# Patient Record
Sex: FEMALE | Race: WHITE | NOT HISPANIC OR LATINO | Employment: UNEMPLOYED | URBAN - NONMETROPOLITAN AREA
[De-identification: names, ages, dates, MRNs, and addresses within clinical notes are randomized per-mention and may not be internally consistent; named-entity substitution may affect disease eponyms.]

---

## 2017-12-07 ENCOUNTER — OFFICE VISIT (OUTPATIENT)
Dept: FAMILY MEDICINE CLINIC | Facility: CLINIC | Age: 22
End: 2017-12-07

## 2017-12-07 VITALS
BODY MASS INDEX: 24.89 KG/M2 | SYSTOLIC BLOOD PRESSURE: 120 MMHG | TEMPERATURE: 98.6 F | HEIGHT: 68 IN | OXYGEN SATURATION: 96 % | WEIGHT: 164.2 LBS | HEART RATE: 88 BPM | DIASTOLIC BLOOD PRESSURE: 78 MMHG

## 2017-12-07 DIAGNOSIS — M54.2 NECK PAIN: Primary | ICD-10-CM

## 2017-12-07 DIAGNOSIS — M41.9 SCOLIOSIS OF THORACIC SPINE, UNSPECIFIED SCOLIOSIS TYPE: ICD-10-CM

## 2017-12-07 DIAGNOSIS — H60.501 ACUTE OTITIS EXTERNA OF RIGHT EAR, UNSPECIFIED TYPE: ICD-10-CM

## 2017-12-07 PROCEDURE — 99204 OFFICE O/P NEW MOD 45 MIN: CPT | Performed by: FAMILY MEDICINE

## 2017-12-07 RX ORDER — FLUTICASONE PROPIONATE 50 MCG
2 SPRAY, SUSPENSION (ML) NASAL DAILY
Qty: 1 BOTTLE | Refills: 11 | Status: SHIPPED | OUTPATIENT
Start: 2017-12-07 | End: 2018-01-02

## 2017-12-07 RX ORDER — OFLOXACIN 3 MG/ML
5 SOLUTION AURICULAR (OTIC) DAILY
Qty: 5 ML | Refills: 0 | Status: SHIPPED | OUTPATIENT
Start: 2017-12-07 | End: 2018-01-02

## 2017-12-07 RX ORDER — NICOTINE 21 MG/24HR
1 PATCH, TRANSDERMAL 24 HOURS TRANSDERMAL EVERY 24 HOURS
Qty: 28 PATCH | Refills: 0 | Status: SHIPPED | OUTPATIENT
Start: 2017-12-07 | End: 2018-03-21

## 2017-12-07 RX ORDER — TIZANIDINE 4 MG/1
4 TABLET ORAL EVERY 6 HOURS PRN
Qty: 60 TABLET | Refills: 1 | Status: SHIPPED | OUTPATIENT
Start: 2017-12-07 | End: 2018-03-21

## 2017-12-07 RX ORDER — LORATADINE 10 MG/1
10 TABLET ORAL DAILY
Qty: 30 TABLET | Refills: 11 | Status: SHIPPED | OUTPATIENT
Start: 2017-12-07 | End: 2018-03-21

## 2017-12-07 RX ORDER — GUAIFENESIN/DEXTROMETHORPHAN 100-10MG/5
10 SYRUP ORAL 4 TIMES DAILY PRN
Qty: 240 ML | Refills: 1 | Status: SHIPPED | OUTPATIENT
Start: 2017-12-07 | End: 2018-01-02

## 2017-12-07 RX ORDER — FLUCONAZOLE 150 MG/1
TABLET ORAL
Qty: 2 TABLET | Refills: 0 | Status: SHIPPED | OUTPATIENT
Start: 2017-12-07 | End: 2018-01-02

## 2017-12-07 NOTE — PROGRESS NOTES
Subjective   Zita Haas is a 22 y.o. female.     History of Present Illness     Pain right shoulder/neck area for over a year.  Remembers no trauma but was told she had scoliosis.  She had tried chiropractors in past but she would pop her own neck and lose the benefit she got from the chiropractor  Right ear problem for 4 days.  Had tried h2o2 into ear and no help.  Was on antibiotics, stopped when getting a yeast infection.  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  Transportation is a problem, cant make it to [place] for aa meetings    Pmh:  Anxiety, depression, ptsd  Psh: c section  Sh: smokes cigarettes 9 yrs, no etoh or pot, unemployed, single  Fh: none listed    Review of Systems   Constitutional: Negative for chills, fatigue and fever.   HENT: Positive for congestion, ear pain, hearing loss, sneezing and sore throat. Negative for ear discharge, facial swelling, postnasal drip, rhinorrhea, sinus pressure, trouble swallowing and voice change.    Eyes: Negative for discharge, redness and visual disturbance.   Respiratory: Positive for cough, shortness of breath and wheezing. Negative for chest tightness.    Cardiovascular: Negative for chest pain and palpitations.   Gastrointestinal: Negative for abdominal pain, blood in stool, constipation, diarrhea, nausea and vomiting.   Endocrine: Negative for polydipsia and polyuria.   Genitourinary: Negative for dysuria, flank pain, hematuria and urgency.   Musculoskeletal: Positive for back pain. Negative for arthralgias, joint swelling and myalgias.   Skin: Negative for rash.   Neurological: Positive for numbness. Negative for dizziness, weakness and headaches.   Hematological: Negative for adenopathy.   Psychiatric/Behavioral: Positive for sleep disturbance. Negative for confusion. The patient is nervous/anxious.        Objective   Physical Exam   Constitutional: She is oriented to person, place, and time. She appears well-developed and well-nourished.    HENT:   Head: Normocephalic and atraumatic.   Left Ear: External ear normal.   Nose: Nose normal.   Mouth/Throat: Oropharynx is clear and moist.   Ear canal right ear moist, skin macerated and pink. Tm is fine   Eyes: Conjunctivae and EOM are normal. Pupils are equal, round, and reactive to light.   Neck: Normal range of motion. Neck supple.   Cardiovascular: Normal rate, regular rhythm and normal heart sounds.  Exam reveals no gallop and no friction rub.    No murmur heard.  Pulmonary/Chest: Effort normal and breath sounds normal.   Abdominal: Soft. Bowel sounds are normal. She exhibits no distension. There is no tenderness. There is no rebound and no guarding.   Musculoskeletal: Normal range of motion. She exhibits no edema or deformity.   Very tender to light touch to right base of neck/shoulder.    Neurological: She is alert and oriented to person, place, and time. No cranial nerve deficit.   Skin: Skin is warm and dry. No rash noted. No erythema.   Psychiatric: She has a normal mood and affect. Her behavior is normal. Judgment and thought content normal.   Nursing note and vitals reviewed.      Assessment/Plan   Zita was seen today for ear problem, neck pain and shoulder pain.    Diagnoses and all orders for this visit:    Neck pain  -     XR Spine Cervical Complete 4 or 5 View  -     XR Spine Thoracic 3 View (In Office)  -     Ambulatory Referral to Physical Therapy    Acute otitis externa of right ear, unspecified type    Scoliosis of thoracic spine, unspecified scoliosis type  -     Ambulatory Referral to Physical Therapy    Other orders  -     loratadine (CLARITIN) 10 MG tablet; Take 1 tablet by mouth Daily.  -     fluticasone (FLONASE) 50 MCG/ACT nasal spray; 2 sprays into each nostril Daily.  -     guaifenesin-dextromethorphan (ROBITUSSIN DM) 100-10 MG/5ML syrup; Take 10 mL by mouth 4 (Four) Times a Day As Needed for Cough.  -     ofloxacin (FLOXIN) 0.3 % otic solution; Administer 5 drops to the right  ear Daily. For 7 days.  -     fluconazole (DIFLUCAN) 150 MG tablet; One now and one in 3 days.  -     tiZANidine (ZANAFLEX) 4 MG tablet; Take 1 tablet by mouth Every 6 (Six) Hours As Needed for Muscle Spasms.  -     diclofenac (VOLTAREN) 50 MG EC tablet; Take 1 tablet by mouth 2 (Two) Times a Day.  -     nicotine (NICODERM CQ) 14 MG/24HR patch; Place 1 patch on the skin Daily.  -     nicotine (NICODERM CQ) 7 MG/24HR patch; Place 1 patch on the skin Daily.      She wants to try the patches for smoking cessation  Xray scoliosis, order pt  Went over meds.

## 2017-12-14 ENCOUNTER — HOSPITAL ENCOUNTER (OUTPATIENT)
Dept: PHYSICAL THERAPY | Facility: HOSPITAL | Age: 22
Setting detail: THERAPIES SERIES
Discharge: HOME | End: 2017-12-14

## 2017-12-14 DIAGNOSIS — M54.2 CERVICALGIA: ICD-10-CM

## 2017-12-14 DIAGNOSIS — M41.34 THORACOGENIC SCOLIOSIS OF THORACIC REGION: Primary | ICD-10-CM

## 2017-12-14 PROCEDURE — 97110 THERAPEUTIC EXERCISES: CPT | Performed by: PHYSICAL THERAPIST

## 2017-12-14 PROCEDURE — 97161 PT EVAL LOW COMPLEX 20 MIN: CPT | Performed by: PHYSICAL THERAPIST

## 2017-12-14 NOTE — PROGRESS NOTES
Outpatient Physical Therapy Ortho Initial Evaluation  [facility]     Patient Name: Zita Haas  : 1995  MRN: [id number]  Today's Date: 2017      Visit Date: 2017        Attendance    Authorized 20   Pre Rx pain 9   Post Rx pain 9   % improvement 0   MD follow up PRN   Recert date 18            Past Medical History:   Diagnosis Date   • Depression    • Migraine    • Tobacco dependence syndrome         Past Surgical History:   Procedure Laterality Date   •  SECTION     MEDICATIONS:       amoxicillin-clavulanate (AUGMENTIN) 875-125 MG per tablet      diclofenac (VOLTAREN) 50 MG EC tablet      fluconazole (DIFLUCAN) 150 MG tablet      fluticasone (FLONASE) 50 MCG/ACT nasal spray      guaifenesin-dextromethorphan (ROBITUSSIN DM) 100-10 MG/5ML syrup      loratadine (CLARITIN) 10 MG tablet      naproxen (NAPROSYN) 375 MG tablet      nicotine (NICODERM CQ) 14 MG/24HR patch      nicotine (NICODERM CQ) 7 MG/24HR patch      ofloxacin (FLOXIN) 0.3 % otic solution      tiZANidine (ZANAFLEX) 4 MG tablet      varenicline (CHANTIX STARTING MONTH ) 0.5 MG X 11 & 1 MG X 42 tablet      ALLERGIES: NKDA    Visit Dx:     ICD-10-CM ICD-9-CM   1. Thoracogenic scoliosis of thoracic region M41.34 737.34   2. Cervicalgia M54.2 723.1             Patient History       17 1300          History    Chief Complaint Pain  -DD      Type of Pain Neck pain  -DD      Date Current Problem(s) Began --   2 weeks  -DD      Brief Description of Current Complaint Dx scoliosis a long  time, never did therapy she was prescribed, Increased pain over the4 last 2 weeks.  -DD      Hand Dominance right-handed  -DD      Occupation/sports/leisure activities Unemployed previous  at [employer]  -DD      What clinical tests have you had for this problem? X-ray  -DD        User Key  (r) = Recorded By, (t) = Taken By, (c) = Cosigned By    Initials Name Provider Type    DD Magda Hilario, PT  Physical Therapist                PT Ortho       12/14/17 1300    Subjective Comments    Subjective Comments Ribs are different on the right than left when you look at my stomach  -DD    Subjective Pain    Able to rate subjective pain? yes  -DD    Pre-Treatment Pain Level 9  -DD    Posture/Observations    Posture/Observations Comments Righ rib hump, outflared ribs  -DD    ROM (Range of Motion)    General ROM Detail Cervical [id number], ext 45, Right Rot 70 left 68, R lat flexion 30, Lefet 27. Full UE ROM   -DD    MMT (Manual Muscle Testing)    General MMT Assessment Detail 4+/5 flexion abd, ER 4/5, IR 5-/5  -DD      User Key  (r) = Recorded By, (t) = Taken By, (c) = Cosigned By    Initials Name Provider Type    OLYA Hilario, ERICK Physical Therapist                      Therapy Education  Given: HEP  Program: New  How Provided: Verbal, Written  Provided to: Patient  Level of Understanding: Verbalized, Demonstrated           PT OP Goals       12/14/17 1300       PT Short Term Goals    STG Date to Achieve 01/04/18  -DD     STG 1 Patient will be independent in home exercise program  -DD     STG 2 Patient will have improvement of 50%  -DD     STG 3 Patient will have pain-free cervical range of motion  -DD     STG 4 Patient will have pain-free upper extremity range of motion  -DD     STG 5 Pt will have NDI score 44% or better.  -DD     Long Term Goals    LTG Date to Achieve 01/18/18  -DD     Time Calculation    PT Goal Re-Cert Due Date 01/04/18  -DD       User Key  (r) = Recorded By, (t) = Taken By, (c) = Cosigned By    Initials Name Provider Type    OLYA Hilario, ERICK Physical Therapist                PT Assessment/Plan       12/14/17 1455       PT Assessment    Functional Limitations Performance in leisure activities;Limitation in home management;Performance in self-care ADL;Performance in work activities  -DD     Impairments Posture;Poor body mechanics;Pain;Muscle strength;Range of motion;Impaired  muscle endurance  -DD     Assessment Comments Patient has has significant biomechanical issues secondary to scoliosis.  She needs education to stretching flexibility and core and scapular stabilization activities muscle endurance training  -DD     Please refer to paper survey for additional self-reported information Yes  -DD     Rehab Potential Good  -DD     Patient/caregiver participated in establishment of treatment plan and goals Yes  -DD     Patient would benefit from skilled therapy intervention Yes  -DD     PT Plan    PT Frequency 2x/week  -DD     Predicted Duration of Therapy Intervention (days/wks) 4 wks  -DD     Planned CPT's? PT EVAL LOW COMPLEXITY: 32440;PT MANUAL THERAPY EA 15 MIN: 04763;PT THER PROC EA 15 MIN: 28472;PT ELECTRICAL STIM UNATTEND: ;PT HOT/COLD PACK WC NONMCARE: 80936  -DD     Physical Therapy Interventions (Optional Details) manual therapy techniques;postural re-education;modalities;home exercise program;lumbar stabilization;strengthening;stretching  -DD     PT Plan Comments Core stabilization program for thoracic and cervical musculature.  Biomechanical postural defect due to scoliosis.  Modalities may include heat and stim.  -DD       User Key  (r) = Recorded By, (t) = Taken By, (c) = Cosigned By    Initials Name Provider Type    DD Magda Hliario, PT Physical Therapist                  Exercises       12/14/17 1300          Subjective Comments    Subjective Comments Ribs are different on the right than left when you look at my stomach  -DD      Subjective Pain    Able to rate subjective pain? yes  -DD      Pre-Treatment Pain Level 9  -DD      Exercise 1    Exercise Name 1 CT  -DD      Reps 1 10  -DD      Exercise 2    Exercise Name 2 shoulder rolls  -DD      Reps 2 20  -DD      Exercise 3    Exercise Name 3 no $  -DD      Reps 3 20  -DD      Exercise 4    Exercise Name 4 Right side bend stretch seated  -DD      Time (Minutes) 4 3  -DD      Exercise 5    Exercise Name 5  posterior capsule stretch  -DD      Reps 5 3  -DD      Time (Seconds) 5 30  -DD      Exercise 6    Exercise Name 6 pec door stretch  -DD      Reps 6 2  -DD      Time (Seconds) 6 30  -DD      Exercise 7    Exercise Name 7 UT stretch  -DD      Reps 7 2  -DD      Time (Seconds) 7 30  -DD      Exercise 8    Exercise Name 8 Levator stretch  -DD      Reps 8 2  -DD      Time (Seconds) 8 30  -DD      Exercise 9    Exercise Name 9 mid back stretch  -DD      Reps 9 2  -DD      Time (Seconds) 9 30  -DD        User Key  (r) = Recorded By, (t) = Taken By, (c) = Cosigned By    Initials Name Provider Type    DD Magda Hilario, PT Physical Therapist                        Outcome Measure Options: Neck Disability Index (NDI)  Neck Disability Index  Section 1 - Pain Intensity: The pain is fairly severe at the moment.  Section 2 - Personal Care: I can look after myself normally, but it causes extra pain.  Section 3 - Lifting: Pain prevents me from lifting heavy weights, but I can manage light weights if they are conveniently positioned.  Section 4 - Work: I can do most of my usual work, but no more  Section 5 - Headaches: I have moderate headaches that come frequently  Section 6 - Concentration: I have a lot of difficulty concentrating.  Section 7 - Sleeping: My sleep is mildly disturbed for up to 1-2 hours.  Section 8 - Driving: I can't drive as long as I want because of moderate neck pain.  Section 9 - Reading: I can't read as much as I want because of severe neck pain.  Section 10 - Recreation: I have neck pain with most recreational activities.  Neck Disability Index Score: 27  Neck Disability Index Comments: 54%      Time Calculation:   Start Time: 1300  Stop Time: 1340  Time Calculation (min): 40 min     Therapy Charges for Today     Code Description Service Date Service Provider Modifiers Qty    30717313721 HC PT THER PROC EA 15 MIN 12/14/2017 Magda Hilario, PT GP 2    [id number]  PT EVAL LOW COMPLEXITY 1  12/14/2017 Magda Hilario, PT GP 1          PT G-Codes  Outcome Measure Options: Neck Disability Index (NDI)         Magda Hilario, PT, ATC, DPT  12/14/2017

## 2017-12-18 ENCOUNTER — HOSPITAL ENCOUNTER (OUTPATIENT)
Dept: PHYSICAL THERAPY | Facility: HOSPITAL | Age: 22
Setting detail: THERAPIES SERIES
Discharge: HOME | End: 2017-12-18

## 2017-12-18 DIAGNOSIS — M41.34 THORACOGENIC SCOLIOSIS OF THORACIC REGION: Primary | ICD-10-CM

## 2017-12-18 DIAGNOSIS — M54.2 CERVICALGIA: ICD-10-CM

## 2017-12-18 PROCEDURE — 97110 THERAPEUTIC EXERCISES: CPT

## 2017-12-18 PROCEDURE — G0283 ELEC STIM OTHER THAN WOUND: HCPCS

## 2017-12-18 NOTE — PROGRESS NOTES
Outpatient Physical Therapy Ortho Treatment Note   [facility]     Patient Name: Zita Haas  : 1995  MRN: [id number]  Today's Date: 2017      Visit Date: 2017    Subjective Improvement:  0%     Attendance:   Approved:   20 Visits        MD follow up:   prn         date:   18      Visit Dx:    ICD-10-CM ICD-9-CM   1. Thoracogenic scoliosis of thoracic region M41.34 737.34   2. Cervicalgia M54.2 723.1       There is no problem list on file for this patient.       Past Medical History:   Diagnosis Date   • Depression    • Migraine    • Tobacco dependence syndrome         Past Surgical History:   Procedure Laterality Date   •  SECTION               PT Ortho       17 1300    Subjective Pain    Able to rate subjective pain? yes  -TM    Pre-Treatment Pain Level 8  -TM    Subjective Pain Comment neck & upper back  -TM    Posture/Observations    Posture/Observations Comments R concave scoliosis  -TM      User Key  (r) = Recorded By, (t) = Taken By, (c) = Cosigned By    Initials Name Provider Type    TM Catie Reddy PTA Physical Therapy Assistant                            PT Assessment/Plan       17 1400       PT Assessment    Functional Limitations Performance in leisure activities;Limitation in home management;Performance in self-care ADL;Performance in work activities  -TM     Impairments Posture;Poor body mechanics;Pain;Muscle strength;Range of motion;Impaired muscle endurance  -TM     Assessment Comments Pt required very few cues for current HEP.  Presented with high reported pain level.  Tolerated therex well.  Added IFC/MH post therex for pain relief.   -TM     Rehab Potential Good  -TM     Patient/caregiver participated in establishment of treatment plan and goals Yes  -TM     Patient would benefit from skilled therapy intervention Yes  -TM     PT Plan    PT Frequency 2x/week  -TM     Predicted Duration of Therapy Intervention (days/wks) 4  "weeks  -TM     PT Plan Comments seated press downs  -TM       User Key  (r) = Recorded By, (t) = Taken By, (c) = Cosigned By    Initials Name Provider Type    TM Catie Reddy PTA Physical Therapy Assistant                Modalities       12/18/17 1300          ELECTRICAL STIMULATION    Attended/Unattended Unattended  -TM      Stimulation Type IFC  -TM      Location/Electrode Placement/Other cervical/thoracic  -TM      Rx Minutes 20 mins  -TM        User Key  (r) = Recorded By, (t) = Taken By, (c) = Cosigned By    Initials Name Provider Type     Catie Reddy PTA Physical Therapy Assistant                Exercises       12/18/17 1300          Subjective Pain    Able to rate subjective pain? yes  -TM      Pre-Treatment Pain Level 8  -TM      Subjective Pain Comment neck & upper back  -TM      Exercise 1    Exercise Name 1 doorway pec stretch  -TM      Reps 1 2  -TM      Time (Seconds) 1 30  -TM      Exercise 2    Exercise Name 2 midback stretch  -TM      Reps 2 2  -TM      Time (Seconds) 2 30  -TM      Exercise 3    Exercise Name 3 UT stretch B  -TM      Reps 3 2  -TM      Time (Seconds) 3 30  -TM      Exercise 4    Exercise Name 4 levator stretch B  -TM      Reps 4 2  -TM      Time (Seconds) 4 30  -TM      Exercise 5    Exercise Name 5 R side bend seated stretch  -TM      Reps 5 2  -TM      Time (Seconds) 5 30  -TM      Exercise 6    Exercise Name 6 chin tucks  -TM      Sets 6 2  -TM      Reps 6 10  -TM      Exercise 7    Exercise Name 7 shoulder rolls fwd/bwd  -TM      Reps 7 20  -TM      Exercise 8    Exercise Name 8 no money  -TM      Sets 8 2  -TM      Reps 8 10  -TM      Time (Seconds) 8 5\"  -TM      Exercise 9    Exercise Name 9 reverse corner push outs  -TM      Sets 9 2  -TM      Reps 9 10  -TM        User Key  (r) = Recorded By, (t) = Taken By, (c) = Cosigned By    Initials Name Provider Type     Catie Reddy PTA Physical Therapy Assistant                               PT OP Goals     "   12/18/17 1300       PT Short Term Goals    STG Date to Achieve 01/04/18  -TM     STG 1 Patient will be independent in home exercise program  -TM     STG 1 Progress Ongoing  -TM     STG 2 Patient will have improvement of 50%  -TM     STG 2 Progress Ongoing  -TM     STG 3 Patient will have pain-free cervical range of motion  -TM     STG 3 Progress Ongoing  -TM     STG 4 Patient will have pain-free upper extremity range of motion  -TM     STG 4 Progress Ongoing  -TM     STG 5 Pt will have NDI score 44% or better.  -TM     STG 5 Progress Ongoing  -TM     Long Term Goals    LTG Date to Achieve 01/18/18  -       User Key  (r) = Recorded By, (t) = Taken By, (c) = Cosigned By    Initials Name Provider Type     Catie Reddy PTA Physical Therapy Assistant                         Time Calculation:   Start Time: 1345  Stop Time: 1435  Time Calculation (min): 50 min  Total Timed Code Minutes- PT: 30 minute(s)    Therapy Charges for Today     Code Description Service Date Service Provider Modifiers Qty    25847162863 HC PT THER PROC EA 15 MIN 12/18/2017 Catie Reddy PTA GP 2    01050942373 HC PT ELECTRICAL STIM UNATTENDED 12/18/2017 Catie Reddy PTA  1                    Catie Reddy PTA  12/18/2017

## 2017-12-28 ENCOUNTER — HOSPITAL ENCOUNTER (OUTPATIENT)
Dept: PHYSICAL THERAPY | Facility: HOSPITAL | Age: 22
Setting detail: THERAPIES SERIES
End: 2017-12-28

## 2018-01-02 ENCOUNTER — HOSPITAL ENCOUNTER (OUTPATIENT)
Dept: PHYSICAL THERAPY | Facility: HOSPITAL | Age: 23
Setting detail: THERAPIES SERIES
Discharge: HOME | End: 2018-01-02

## 2018-01-02 ENCOUNTER — PROCEDURE VISIT (OUTPATIENT)
Dept: OBSTETRICS AND GYNECOLOGY | Facility: CLINIC | Age: 23
End: 2018-01-02

## 2018-01-02 VITALS
DIASTOLIC BLOOD PRESSURE: 68 MMHG | HEIGHT: 69 IN | WEIGHT: 173 LBS | SYSTOLIC BLOOD PRESSURE: 112 MMHG | BODY MASS INDEX: 25.62 KG/M2

## 2018-01-02 DIAGNOSIS — M41.34 THORACOGENIC SCOLIOSIS OF THORACIC REGION: Primary | ICD-10-CM

## 2018-01-02 DIAGNOSIS — Z01.419 WOMEN'S ANNUAL ROUTINE GYNECOLOGICAL EXAMINATION: Primary | ICD-10-CM

## 2018-01-02 DIAGNOSIS — M54.2 CERVICALGIA: ICD-10-CM

## 2018-01-02 PROCEDURE — 88142 CYTOPATH C/V THIN LAYER: CPT | Performed by: OBSTETRICS & GYNECOLOGY

## 2018-01-02 PROCEDURE — 99395 PREV VISIT EST AGE 18-39: CPT | Performed by: OBSTETRICS & GYNECOLOGY

## 2018-01-02 PROCEDURE — G0283 ELEC STIM OTHER THAN WOUND: HCPCS

## 2018-01-02 PROCEDURE — 97110 THERAPEUTIC EXERCISES: CPT

## 2018-01-02 NOTE — PROGRESS NOTES
Outpatient Physical Therapy Ortho Treatment Note   [facility]     Patient Name: Zita Haas  : 1995  MRN: [id number]  Today's Date: 2018      Visit Date: 2018    Subjective Improvement:     Neck 40%; back 20%  Attendance: 3/3  Approved:     20 Visits      MD follow up:   prn         date:  18       Visit Dx:    ICD-10-CM ICD-9-CM   1. Thoracogenic scoliosis of thoracic region M41.34 737.34   2. Cervicalgia M54.2 723.1       There is no problem list on file for this patient.       Past Medical History:   Diagnosis Date   • Depression    • Migraine    • Tobacco dependence syndrome         Past Surgical History:   Procedure Laterality Date   •  SECTION               PT Ortho       18 1300    Subjective Pain    Able to rate subjective pain? yes  -TM    Pre-Treatment Pain Level 7  -TM    Subjective Pain Comment upper back  -TM      User Key  (r) = Recorded By, (t) = Taken By, (c) = Cosigned By    Initials Name Provider Type     Catie Reddy PTA Physical Therapy Assistant                            PT Assessment/Plan       18 1300       PT Assessment    Functional Limitations Performance in leisure activities;Limitation in home management;Performance in self-care ADL;Performance in work activities  -TM     Impairments Posture;Poor body mechanics;Pain;Muscle strength;Range of motion;Impaired muscle endurance  -TM     Assessment Comments Pt reporting greater improvement with neck than back at this time.  Good tolerance for therex.  -TM     Rehab Potential Good  -TM     Patient/caregiver participated in establishment of treatment plan and goals Yes  -TM     Patient would benefit from skilled therapy intervention Yes  -TM     PT Plan    PT Frequency 2x/week  -TM     Predicted Duration of Therapy Intervention (days/wks) 4 weeks  -TM       User Key  (r) = Recorded By, (t) = Taken By, (c) = Cosigned By    Initials Name Provider Type    JORGE Baker  SAMIA Reddy Physical Therapy Assistant                Modalities       01/02/18 1300          Moist Heat    MH Applied Yes  -TM      Location upper back with IFC  -TM      Rx Minutes --   20 min  -TM      MH S/P Rx Yes  -TM      ELECTRICAL STIMULATION    Attended/Unattended Unattended  -TM      Stimulation Type IFC  -TM      Location/Electrode Placement/Other cervical/thoracic  -TM      Rx Minutes 20 mins  -TM        User Key  (r) = Recorded By, (t) = Taken By, (c) = Cosigned By    Initials Name Provider Type    TM Catie Olivia Reddy PTA Physical Therapy Assistant                Exercises       01/02/18 1300          Subjective Comments    Subjective Comments Reports neck feeling better, but back still hurting.  Thinks IFC last visit was helpful.  -TM      Subjective Pain    Able to rate subjective pain? yes  -TM      Pre-Treatment Pain Level 7  -TM      Subjective Pain Comment upper back  -TM      Exercise 1    Exercise Name 1 PRO II UE fwd/bwd  -TM      Time (Minutes) 1 8  -TM      Additional Comments level 2  -TM      Exercise 2    Exercise Name 2 doorway pec stretch  -TM      Reps 2 2  -TM      Time (Seconds) 2 30  -TM      Exercise 3    Exercise Name 3 midback stretch  -TM      Reps 3 2  -TM      Time (Seconds) 3 30  -TM      Exercise 4    Exercise Name 4 R side bend stretch  -TM      Reps 4 2  -TM      Time (Seconds) 4 30  -TM      Exercise 5    Exercise Name 5 chin tucks  -TM      Sets 5 2  -TM      Reps 5 10  -TM      Exercise 6    Exercise Name 6 no money  -TM      Sets 6 2  -TM      Reps 6 10  -TM      Exercise 7    Exercise Name 7 T Band rows  -TM      Sets 7 2  -TM      Reps 7 10  -TM      Additional Comments green  -TM      Exercise 8    Exercise Name 8 T Band ext  -TM      Sets 8 2  -TM      Reps 8 10  -TM      Additional Comments green  -TM      Exercise 9    Exercise Name 9 reverse corners  -TM      Sets 9 2  -TM      Reps 9 10  -TM        User Key  (r) = Recorded By, (t) = Taken By, (c) = Cosigned  By    Initials Name Provider Type     Catie Reddy PTA Physical Therapy Assistant                               PT OP Goals       01/02/18 1300       PT Short Term Goals    STG Date to Achieve 01/04/18  -TM     STG 1 Patient will be independent in home exercise program  -TM     STG 1 Progress Ongoing  -TM     STG 2 Patient will have improvement of 50%  -TM     STG 2 Progress Ongoing  -TM     STG 3 Patient will have pain-free cervical range of motion  -TM     STG 3 Progress Ongoing  -TM     STG 4 Patient will have pain-free upper extremity range of motion  -TM     STG 4 Progress Ongoing  -TM     STG 5 Pt will have NDI score 44% or better.  -TM     STG 5 Progress Ongoing  -TM     Long Term Goals    LTG Date to Achieve 01/18/18  -       User Key  (r) = Recorded By, (t) = Taken By, (c) = Cosigned By    Initials Name Provider Type    TM Catie Reddy PTA Physical Therapy Assistant                         Time Calculation:   Start Time: 1347  Stop Time: 1440  Time Calculation (min): 53 min  Total Timed Code Minutes- PT: 33 minute(s)    Therapy Charges for Today     Code Description Service Date Service Provider Modifiers Qty    50950513620 HC PT ELECTRICAL STIM UNATTENDED 1/2/2018 Catie Reddy, PTA  1    55103794439 HC PT THER PROC EA 15 MIN 1/2/2018 Catie Reddy PTA GP 2                    Catie Reddy PTA  1/2/2018

## 2018-01-02 NOTE — PROGRESS NOTES
Subjective   Zita Haas is a 22 y.o. female.     HPI Comments: Patient presents today for annual gynecologic exam   Last pap smear was   All paps have been normal    LMP: 17.  Periods are irregular  Female partner  Considering Mirena for contraception    Gynecologic Exam   The patient's pertinent negatives include no vaginal discharge. Pertinent negatives include no abdominal pain.         Review of Systems   Gastrointestinal: Negative for abdominal pain.   Genitourinary: Negative for menstrual problem, vaginal bleeding and vaginal discharge.   All other systems reviewed and are negative.      Objective   Physical Exam   Constitutional: She is oriented to person, place, and time. She appears well-developed and well-nourished. No distress.   HENT:   Head: Normocephalic and atraumatic.   Right Ear: External ear normal.   Left Ear: External ear normal.   Nose: Nose normal.   Mouth/Throat: Oropharynx is clear and moist. No oropharyngeal exudate.   Eyes: Conjunctivae and EOM are normal. Pupils are equal, round, and reactive to light. Right eye exhibits no discharge. Left eye exhibits no discharge. No scleral icterus.   Neck: Normal range of motion. Neck supple. No tracheal deviation present. No thyromegaly present.   Cardiovascular: Normal rate, regular rhythm, normal heart sounds and intact distal pulses.  Exam reveals no gallop and no friction rub.    No murmur heard.  Pulmonary/Chest: Effort normal and breath sounds normal. No respiratory distress. She has no wheezes. She has no rales. She exhibits no mass, no tenderness, no laceration, no deformity and no retraction. Right breast exhibits no inverted nipple, no mass, no nipple discharge, no skin change and no tenderness. Left breast exhibits no inverted nipple, no mass, no nipple discharge, no skin change and no tenderness. Breasts are symmetrical. There is no breast swelling.   Abdominal: Soft. She exhibits no distension and no mass. There is  no tenderness. There is no rebound and no guarding. No hernia.   Genitourinary: Vagina normal and uterus normal. No breast tenderness, discharge or bleeding. Pelvic exam was performed with patient supine. No labial fusion. There is no rash, tenderness, lesion or injury on the right labia. There is no rash, tenderness, lesion or injury on the left labia. Uterus is not deviated, not enlarged, not fixed and not tender. Cervix exhibits no motion tenderness, no discharge and no friability. Right adnexum displays no mass, no tenderness and no fullness. Left adnexum displays no mass, no tenderness and no fullness. No erythema, tenderness or bleeding in the vagina. No foreign body in the vagina. No signs of injury around the vagina. No vaginal discharge found.   Genitourinary Comments: Pap collected     Musculoskeletal: Normal range of motion. She exhibits no edema or deformity.   Neurological: She is alert and oriented to person, place, and time. No cranial nerve deficit. Coordination normal.   Skin: Skin is warm and dry. No rash noted. She is not diaphoretic. No erythema. No pallor.   Psychiatric: She has a normal mood and affect. Her behavior is normal. Judgment and thought content normal.   Vitals reviewed.      Assessment/Plan   Zita was seen today for gynecologic exam.    Diagnoses and all orders for this visit:    Women's annual routine gynecological examination  -     Liquid-based Pap Smear, Screening - ThinPrep Vial, Cervix  -     Pregnancy, Urine - Urine, Clean Catch       Pap collected  F/u 1 year and PRN

## 2018-01-04 LAB
LAB AP CASE REPORT: NORMAL
LAB AP GYN ADDITIONAL INFORMATION: NORMAL
Lab: NORMAL
PATH INTERP SPEC-IMP: NORMAL
STAT OF ADQ CVX/VAG CYTO-IMP: NORMAL

## 2018-01-05 ENCOUNTER — TELEPHONE (OUTPATIENT)
Dept: OBSTETRICS AND GYNECOLOGY | Facility: CLINIC | Age: 23
End: 2018-01-05

## 2018-01-05 NOTE — TELEPHONE ENCOUNTER
----- Message from Segunod Pino MD sent at 1/5/2018 10:20 AM CST -----  Please send normal pap letter  I sent a normal pap letter.

## 2018-01-09 ENCOUNTER — HOSPITAL ENCOUNTER (OUTPATIENT)
Dept: PHYSICAL THERAPY | Facility: HOSPITAL | Age: 23
Setting detail: THERAPIES SERIES
Discharge: HOME | End: 2018-01-09

## 2018-01-09 DIAGNOSIS — M41.34 THORACOGENIC SCOLIOSIS OF THORACIC REGION: Primary | ICD-10-CM

## 2018-01-09 DIAGNOSIS — M54.2 CERVICALGIA: ICD-10-CM

## 2018-01-09 PROCEDURE — G0283 ELEC STIM OTHER THAN WOUND: HCPCS

## 2018-01-09 PROCEDURE — 97110 THERAPEUTIC EXERCISES: CPT

## 2018-01-09 NOTE — PROGRESS NOTES
Outpatient Physical Therapy Ortho Treatment Note   [facility]     Patient Name: Zita Haas  : 1995  MRN: [id number]  Today's Date: 2018      Visit Date: 2018    Subjective Improvement:    Neck 40%; Back 20%   Attendance:   Approved:    20 Visits       MD follow up:    prn        date:   18      Visit Dx:    ICD-10-CM ICD-9-CM   1. Thoracogenic scoliosis of thoracic region M41.34 737.34   2. Cervicalgia M54.2 723.1       There is no problem list on file for this patient.       Past Medical History:   Diagnosis Date   • Depression    • Migraine    • Tobacco dependence syndrome         Past Surgical History:   Procedure Laterality Date   •  SECTION               PT Ortho       18 1300    Subjective Comments    Subjective Comments Reports having a toothache today.  Neck still feels better than upper back.  Started a new job this week.    -TM    Subjective Pain    Able to rate subjective pain? yes  -TM    Pre-Treatment Pain Level 5  -TM    Subjective Pain Comment upper back  -TM      User Key  (r) = Recorded By, (t) = Taken By, (c) = Cosigned By    Initials Name Provider Type    TM Catie Reddy PTA Physical Therapy Assistant                            PT Assessment/Plan       18 1400       PT Assessment    Functional Limitations Performance in leisure activities;Limitation in home management;Performance in self-care ADL;Performance in work activities  -TM     Impairments Posture;Poor body mechanics;Pain;Muscle strength;Range of motion;Impaired muscle endurance  -TM     Rehab Potential Good  -TM     Patient/caregiver participated in establishment of treatment plan and goals Yes  -TM     Patient would benefit from skilled therapy intervention Yes  -TM     PT Plan    PT Frequency 2x/week  -TM     Predicted Duration of Therapy Intervention (days/wks) 4 weeks  -TM       User Key  (r) = Recorded By, (t) = Taken By, (c) = Cosigned By    Initials Name  Provider Type    TM Catie Reddy PTA Physical Therapy Assistant                    Exercises       01/09/18 1300          Subjective Comments    Subjective Comments Reports having a toothache today.  Neck still feels better than upper back.  Started a new job this week.    -TM      Subjective Pain    Able to rate subjective pain? yes  -TM      Pre-Treatment Pain Level 5  -TM      Subjective Pain Comment upper back  -TM      Exercise 1    Exercise Name 1 PRO II UE fwd/bwd  -TM      Time (Minutes) 1 8  -TM      Additional Comments level 2  -TM      Exercise 2    Exercise Name 2 doorway pec stretch  -TM      Reps 2 2  -TM      Time (Seconds) 2 30  -TM      Exercise 3    Exercise Name 3 midback stretch  -TM      Reps 3 2  -TM      Time (Seconds) 3 30  -TM      Exercise 4    Exercise Name 4 R side bend stretch over lg bolster  -TM      Reps 4 2  -TM      Time (Seconds) 4 30  -TM      Exercise 5    Exercise Name 5 chin tucks  -TM      Sets 5 2  -TM      Reps 5 10  -TM      Exercise 6    Exercise Name 6 no money  -TM      Sets 6 2  -TM      Reps 6 10  -TM      Additional Comments red  -TM      Exercise 7    Exercise Name 7 T Band rows  -TM      Sets 7 2  -TM      Reps 7 10  -TM      Additional Comments green  -TM      Exercise 8    Exercise Name 8 T Band ext  -TM      Sets 8 2  -TM      Reps 8 10  -TM      Additional Comments green  -TM      Exercise 9    Exercise Name 9 supine B serratus punches  -TM      Sets 9 2  -TM      Reps 9 10  -TM      Additional Comments 2# DB  -TM      Exercise 10    Exercise Name 10 supine scap clocks  -TM      Sets 10 2  -TM      Reps 10 10  -TM      Additional Comments red  -TM        User Key  (r) = Recorded By, (t) = Taken By, (c) = Cosigned By    Initials Name Provider Type    TM Catie Reddy PTA Physical Therapy Assistant                               PT OP Goals       01/09/18 1400       PT Short Term Goals    STG Date to Achieve 01/04/18  -TM     STG 1 Patient will be  independent in home exercise program  -TM     STG 1 Progress Ongoing  -TM     STG 2 Patient will have improvement of 50%  -TM     STG 2 Progress Ongoing  -TM     STG 3 Patient will have pain-free cervical range of motion  -TM     STG 3 Progress Ongoing  -TM     STG 4 Patient will have pain-free upper extremity range of motion  -TM     STG 4 Progress Ongoing  -TM     STG 5 Pt will have NDI score 44% or better.  -TM     STG 5 Progress Ongoing  -TM     Long Term Goals    LTG Date to Achieve 01/18/18  -       User Key  (r) = Recorded By, (t) = Taken By, (c) = Cosigned By    Initials Name Provider Type    TM Catie Reddy PTA Physical Therapy Assistant          Therapy Education  Education Details: HEP: T Band rows, ext, no money, scap clocks  Given: HEP  Program: Progressed  How Provided: Demonstration, Written  Provided to: Patient  Level of Understanding: Teach back education performed              Time Calculation:   Start Time: 1345  Stop Time: 1444  Time Calculation (min): 59 min  Total Timed Code Minutes- PT: 59 minute(s)    Therapy Charges for Today     Code Description Service Date Service Provider Modifiers Qty    28314562520 HC PT ELECTRICAL STIM UNATTENDED 1/9/2018 Catie Reddy PTA  1    67348121099 HC PT THER PROC EA 15 MIN 1/9/2018 Catie Reddy PTA GP 2                    Catie Reddy PTA  1/9/2018

## 2018-01-18 ENCOUNTER — HOSPITAL ENCOUNTER (OUTPATIENT)
Dept: PHYSICAL THERAPY | Facility: HOSPITAL | Age: 23
Setting detail: THERAPIES SERIES
Discharge: HOME | End: 2018-01-18

## 2018-01-18 DIAGNOSIS — M41.34 THORACOGENIC SCOLIOSIS OF THORACIC REGION: Primary | ICD-10-CM

## 2018-01-18 DIAGNOSIS — M54.2 CERVICALGIA: ICD-10-CM

## 2018-01-18 PROCEDURE — G0283 ELEC STIM OTHER THAN WOUND: HCPCS

## 2018-01-18 PROCEDURE — 97110 THERAPEUTIC EXERCISES: CPT

## 2018-01-18 PROCEDURE — G0283 ELEC STIM OTHER THAN WOUND: HCPCS | Performed by: PHYSICAL THERAPIST

## 2018-01-18 PROCEDURE — 97140 MANUAL THERAPY 1/> REGIONS: CPT | Performed by: PHYSICAL THERAPIST

## 2018-01-18 NOTE — PROGRESS NOTES
Outpatient Physical Therapy Ortho Progress Note  [facility]     Patient Name: Zita Haas  : 1995  MRN: [id number]  Today's Date: 2018        Attendance    Authorized 20   Pre Rx pain 9   Post Rx pain 0   % improvement 60%   MD follow up PRN   Recert date            Visit Date: 2018    Visit Dx:    ICD-10-CM ICD-9-CM   1. Thoracogenic scoliosis of thoracic region M41.34 737.34   2. Cervicalgia M54.2 723.1       There is no problem list on file for this patient.       Past Medical History:   Diagnosis Date   • Depression    • Migraine    • Tobacco dependence syndrome         Past Surgical History:   Procedure Laterality Date   •  SECTION               PT Ortho       18 1300    Subjective Pain    Able to rate subjective pain? yes  -TM    Pre-Treatment Pain Level 9  -TM    Post-Treatment Pain Level 0  -DD    Subjective Pain Comment upperback  -DD    Posture/Observations    Posture/Observations Comments slumped rounded shoulder posture, Scolisos with Right rib hump  -DD    ROM (Range of Motion)    General ROM Detail full UE and cervical ROM  -DD    MMT (Manual Muscle Testing)    General MMT Assessment Detail 5/5 UE  -DD      User Key  (r) = Recorded By, (t) = Taken By, (c) = Cosigned By    Initials Name Provider Type    TM Catie Reddy, PTA Physical Therapy Assistant    DD Magda Hilario, PT Physical Therapist                            PT Assessment/Plan       18 1512       PT Assessment    Functional Limitations Performance in work activities;Limitation in home management  -DD     Impairments Pain;Muscle strength;Range of motion  -DD     Assessment Comments Pt reporting good improvement, does not exercises daily. good relief of pain with treatment  -DD     Please refer to paper survey for additional self-reported information No  -DD     Rehab Potential Good  -DD     Patient/caregiver participated in establishment of treatment plan and goals Yes   -DD     Patient would benefit from skilled therapy intervention Yes  -DD     PT Plan    PT Frequency 2x/week  -DD     Predicted Duration of Therapy Intervention (days/wks) 2 weeks  -DD     Planned CPT's? PT MANUAL THERAPY EA 15 MIN: 47775;PT THER PROC EA 15 MIN: 73274;PT ELECTRICAL STIM UNATTEND: ;PT HOT/COLD PACK WC NONMCARE: 56811  -DD     PT Plan Comments Progres back and scapular stabilization, Manual therapy as needed. MH and stim PRN.  -DD       User Key  (r) = Recorded By, (t) = Taken By, (c) = Cosigned By    Initials Name Provider Type    DD Magda Hilario, PT Physical Therapist                Modalities       01/18/18 1300          Moist Heat    MH Applied Yes  -TM      Location upper back with IFC  -TM      Rx Minutes --   20 min  -TM       S/P Rx Yes  -TM      ELECTRICAL STIMULATION    Attended/Unattended Unattended  -TM      Stimulation Type IFC  -TM      Location/Electrode Placement/Other cervical/thoracic  -TM      Rx Minutes 20 mins  -TM        User Key  (r) = Recorded By, (t) = Taken By, (c) = Cosigned By    Initials Name Provider Type    TM Catie Reddy, PTA Physical Therapy Assistant                Exercises       01/18/18 1300          Subjective Comments    Subjective Comments Reports pain up today, but has been working out in the cold.  -TM      Subjective Pain    Able to rate subjective pain? yes  -TM      Pre-Treatment Pain Level 9  -TM      Post-Treatment Pain Level 0  -DD      Subjective Pain Comment upperback  -DD      Exercise 1    Exercise Name 1 PRO II UE fwd/bwd  -TM      Time (Minutes) 1 8  -TM      Additional Comments level 2  -TM      Exercise 2    Exercise Name 2 doorway pec stretch  -TM      Reps 2 2  -TM      Time (Seconds) 2 30  -TM      Exercise 3    Exercise Name 3 midback stretch  -TM      Reps 3 2  -TM      Time (Seconds) 3 30  -TM      Exercise 4    Exercise Name 4 R side bend stretch over lg bolster  -TM      Reps 4 2  -TM      Time (Seconds) 4 30  -TM       Exercise 5    Exercise Name 5 chin tucks  -TM      Sets 5 2  -TM      Reps 5 10  -TM      Exercise 6    Exercise Name 6 no money  -TM      Sets 6 2  -TM      Reps 6 10  -TM      Additional Comments red  -TM      Exercise 7    Exercise Name 7 T Band rows  -TM      Sets 7 2  -TM      Reps 7 10  -TM      Additional Comments green  -TM      Exercise 8    Exercise Name 8 T Band ext  -TM      Sets 8 2  -TM      Reps 8 10  -TM      Additional Comments green  -TM      Exercise 9    Exercise Name 9 T Band trunk rotation B  -TM      Sets 9 2  -TM      Reps 9 10  -TM      Additional Comments green  -TM      Exercise 10    Exercise Name 10 supine scap clocks  -TM      Sets 10 2  -TM      Reps 10 10  -TM      Additional Comments red  -TM      Exercise 11    Exercise Name 11 supine serratus punches  -TM      Sets 11 2  -TM      Reps 11 10  -TM      Additional Comments 2# DB  -TM      Exercise 12    Exercise Name 12 prone over physioball YTI  -TM      Sets 12 2  -TM      Reps 12 10  -TM        User Key  (r) = Recorded By, (t) = Taken By, (c) = Cosigned By    Initials Name Provider Type    TM Catie Reddy, PTA Physical Therapy Assistant    DD Magda Hilario, PT Physical Therapist                               PT OP Goals       01/18/18 1508 01/18/18 1400    PT Short Term Goals    STG Date to Achieve  01/04/18  -DD    STG 1  Patient will be independent in home exercise program  -DD    STG 1 Progress  Progressing  -DD    STG 2  Patient will have improvement of 50%  -DD    STG 2 Progress  Met  -DD    STG 2 Progress Comments  60%  -DD    STG 3  Patient will have pain-free cervical range of motion  -DD    STG 3 Progress  Met  -DD    STG 4  Patient will have pain-free upper extremity range of motion  -DD    STG 4 Progress  Met  -DD    STG 5  Pt will have NDI score 44% or better.  -DD    STG 5 Progress  Ongoing  -DD    Long Term Goals    LTG Date to Achieve  02/01/18  -DD    LTG 1  Pt will have overall improved posture  with minimal cues  -DD    LTG 2  Pt resting pain less than 3/10  -DD    Time Calculation    PT Goal Re-Cert Due Date 02/01/18  -DD       User Key  (r) = Recorded By, (t) = Taken By, (c) = Cosigned By    Initials Name Provider Type    DD Magda Hilario, PT Physical Therapist                         Time Calculation:   Start Time: 1340  Stop Time: 1447  Time Calculation (min): 67 min  Total Timed Code Minutes- PT: 50 minute(s)    Therapy Charges for Today     Code Description Service Date Service Provider Modifiers Qty    65167269354 HC PT MANUAL THERAPY EA 15 MIN 1/18/2018 Magda Hilario, PT GP 1    87841104697 HC PT ELECTRICAL STIM UNATTENDED 1/18/2018 Magda Hilario, PT  1                    Magda Hilario, PT, ATC, DPT  1/18/2018

## 2018-02-22 ENCOUNTER — DOCUMENTATION (OUTPATIENT)
Dept: PHYSICAL THERAPY | Facility: HOSPITAL | Age: 23
End: 2018-02-22

## 2018-03-21 ENCOUNTER — APPOINTMENT (OUTPATIENT)
Dept: LAB | Facility: HOSPITAL | Age: 23
End: 2018-03-21

## 2018-03-21 PROCEDURE — 84702 CHORIONIC GONADOTROPIN TEST: CPT | Performed by: NURSE PRACTITIONER

## 2018-03-27 ENCOUNTER — OFFICE VISIT (OUTPATIENT)
Dept: FAMILY MEDICINE CLINIC | Facility: CLINIC | Age: 23
End: 2018-03-27

## 2018-03-27 VITALS
OXYGEN SATURATION: 99 % | SYSTOLIC BLOOD PRESSURE: 114 MMHG | TEMPERATURE: 98.1 F | DIASTOLIC BLOOD PRESSURE: 74 MMHG | WEIGHT: 172.8 LBS | HEART RATE: 90 BPM | HEIGHT: 68 IN | BODY MASS INDEX: 26.19 KG/M2

## 2018-03-27 DIAGNOSIS — L30.0 NUMMULAR ECZEMA: ICD-10-CM

## 2018-03-27 DIAGNOSIS — N92.6 IRREGULAR PERIODS: Primary | ICD-10-CM

## 2018-03-27 PROCEDURE — 84702 CHORIONIC GONADOTROPIN TEST: CPT | Performed by: FAMILY MEDICINE

## 2018-03-27 PROCEDURE — 99213 OFFICE O/P EST LOW 20 MIN: CPT | Performed by: FAMILY MEDICINE

## 2018-03-27 PROCEDURE — 36415 COLL VENOUS BLD VENIPUNCTURE: CPT | Performed by: FAMILY MEDICINE

## 2018-03-27 RX ORDER — ACETAMINOPHEN 325 MG/1
650 TABLET ORAL EVERY 6 HOURS PRN
Qty: 100 TABLET | Refills: 0 | Status: SHIPPED | OUTPATIENT
Start: 2018-03-27 | End: 2018-04-30

## 2018-03-27 NOTE — PROGRESS NOTES
Subjective   Zita Haas is a 22 y.o. female.     History of Present Illness     Patient had a quantitative bhcg more than twice normal.  She was told to come to her regular doctor and have another one to make sure pregnancy ok?   She has rash on body 2 months.  Itches. Was told it was fungal, treatment failed.  Was told last was eczema.  Has kenalog 0.1% cream.  She is clean 5 months and is to go to rehab for support.  She has to know if pregnant for rehab.    Review of Systems   Constitutional: Negative for chills, fatigue and fever.   HENT: Negative for congestion, ear discharge, ear pain, facial swelling, hearing loss, postnasal drip, rhinorrhea, sinus pressure, sore throat, trouble swallowing and voice change.    Eyes: Negative for discharge, redness and visual disturbance.   Respiratory: Negative for cough, chest tightness, shortness of breath and wheezing.    Cardiovascular: Negative for chest pain and palpitations.   Gastrointestinal: Negative for abdominal pain, blood in stool, constipation, diarrhea, nausea and vomiting.   Endocrine: Negative for polydipsia and polyuria.   Genitourinary: Negative for dysuria, flank pain, hematuria and urgency.   Musculoskeletal: Negative for arthralgias, back pain, joint swelling and myalgias.   Skin: Positive for rash.   Neurological: Negative for dizziness, weakness, numbness and headaches.   Hematological: Negative for adenopathy.   Psychiatric/Behavioral: Negative for confusion and sleep disturbance. The patient is not nervous/anxious.        Objective   Physical Exam   Constitutional: She is oriented to person, place, and time. She appears well-developed and well-nourished.   HENT:   Head: Normocephalic and atraumatic.   Right Ear: External ear normal.   Left Ear: External ear normal.   Nose: Nose normal.   Eyes: Conjunctivae and EOM are normal. Pupils are equal, round, and reactive to light.   Neck: Normal range of motion.   Pulmonary/Chest: Effort normal.    Musculoskeletal: Normal range of motion.   Neurological: She is alert and oriented to person, place, and time.   Skin: Rash noted.   Several well demarcated red scaley oval/circular plaques 2cm to 4cm.  Arms, torso. (i didn't look at thighs/legs/etc)  No central clearing at all.   Psychiatric: She has a normal mood and affect. Her behavior is normal. Judgment and thought content normal.   Nursing note and vitals reviewed.      Assessment/Plan   Zita was seen today for follow-up.    Diagnoses and all orders for this visit:    Irregular periods  -     HCG, B-subunit, Quantitative    Nummular eczema    Other orders  -     acetaminophen (TYLENOL) 325 MG tablet; Take 2 tablets by mouth Every 6 (Six) Hours As Needed for Mild Pain .      Call for results.

## 2018-03-28 LAB — HCG INTACT+B SERPL-ACNC: 174.75 MIU/ML

## 2018-04-18 ENCOUNTER — HOSPITAL ENCOUNTER (EMERGENCY)
Facility: HOSPITAL | Age: 23
Discharge: HOME | End: 2018-04-18
Attending: EMERGENCY MEDICINE | Admitting: EMERGENCY MEDICINE

## 2018-04-18 ENCOUNTER — HOSPITAL ENCOUNTER (EMERGENCY)
Facility: HOSPITAL | Age: 23
Discharge: LEFT BEFORE DISCHARGE | End: 2018-04-18
Attending: EMERGENCY MEDICINE

## 2018-04-18 VITALS
OXYGEN SATURATION: 100 % | HEIGHT: 68 IN | SYSTOLIC BLOOD PRESSURE: 101 MMHG | DIASTOLIC BLOOD PRESSURE: 54 MMHG | HEART RATE: 83 BPM | BODY MASS INDEX: 26.52 KG/M2 | RESPIRATION RATE: 20 BRPM | TEMPERATURE: 97.5 F | WEIGHT: 175 LBS

## 2018-04-18 VITALS
DIASTOLIC BLOOD PRESSURE: 77 MMHG | WEIGHT: 175 LBS | RESPIRATION RATE: 18 BRPM | BODY MASS INDEX: 27.47 KG/M2 | OXYGEN SATURATION: 100 % | HEART RATE: 92 BPM | HEIGHT: 67 IN | TEMPERATURE: 98.2 F | SYSTOLIC BLOOD PRESSURE: 119 MMHG
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
## 2018-04-18 DIAGNOSIS — Z3A.01 LESS THAN 8 WEEKS GESTATION OF PREGNANCY: ICD-10-CM

## 2018-04-18 DIAGNOSIS — T07.XXXA ABRASIONS OF MULTIPLE SITES: ICD-10-CM

## 2018-04-18 PROCEDURE — 99284 EMERGENCY DEPT VISIT MOD MDM: CPT

## 2018-04-18 PROCEDURE — 90471 IMMUNIZATION ADMIN: CPT | Performed by: PHYSICIAN ASSISTANT

## 2018-04-18 PROCEDURE — 99211 OFF/OP EST MAY X REQ PHY/QHP: CPT

## 2018-04-18 PROCEDURE — 25010000002 TDAP 5-2.5-18.5 LF-MCG/0.5 SUSPENSION: Performed by: PHYSICIAN ASSISTANT

## 2018-04-18 PROCEDURE — 90715 TDAP VACCINE 7 YRS/> IM: CPT | Performed by: PHYSICIAN ASSISTANT

## 2018-04-18 RX ORDER — ACETAMINOPHEN 325 MG/1
650 TABLET ORAL ONCE
Status: COMPLETED | OUTPATIENT
Start: 2018-04-18 | End: 2018-04-18

## 2018-04-18 RX ORDER — DIAPER,BRIEF,INFANT-TODD,DISP
EACH MISCELLANEOUS
Status: COMPLETED
Start: 2018-04-18 | End: 2018-04-18

## 2018-04-18 RX ADMIN — ACETAMINOPHEN 650 MG: 325 TABLET ORAL at 17:36

## 2018-04-18 RX ADMIN — TETANUS TOXOID, REDUCED DIPHTHERIA TOXOID AND ACELLULAR PERTUSSIS VACCINE, ADSORBED 0.5 ML: 5; 2.5; 8; 8; 2.5 SUSPENSION INTRAMUSCULAR at 17:05

## 2018-04-18 RX ADMIN — BACITRACIN: 500 OINTMENT TOPICAL at 17:06

## 2018-04-18 RX ADMIN — MUPIROCIN: 20 OINTMENT TOPICAL at 18:10
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
## 2018-04-30 ENCOUNTER — APPOINTMENT (OUTPATIENT)
Dept: LAB | Facility: HOSPITAL | Age: 23
End: 2018-04-30

## 2018-04-30 ENCOUNTER — OFFICE VISIT (OUTPATIENT)
Dept: OBSTETRICS AND GYNECOLOGY | Facility: CLINIC | Age: 23
End: 2018-04-30

## 2018-04-30 VITALS
BODY MASS INDEX: 26.81 KG/M2 | SYSTOLIC BLOOD PRESSURE: 108 MMHG | DIASTOLIC BLOOD PRESSURE: 68 MMHG | HEIGHT: 69 IN | WEIGHT: 181 LBS

## 2018-04-30 DIAGNOSIS — Z32.01 PREGNANCY EXAMINATION OR TEST, POSITIVE RESULT: Primary | ICD-10-CM

## 2018-04-30 LAB
ABO GROUP BLD: NORMAL
AMPHET+METHAMPHET UR QL: NEGATIVE
BARBITURATES UR QL SCN: NEGATIVE
BASOPHILS # BLD AUTO: 0.07 10*3/MM3 (ref 0–0.2)
BASOPHILS NFR BLD AUTO: 0.5 % (ref 0–2)
BENZODIAZ UR QL SCN: NEGATIVE
BILIRUB UR QL STRIP: NEGATIVE
BLD GP AB SCN SERPL QL: NEGATIVE
CANNABINOIDS SERPL QL: NEGATIVE
CLARITY UR: ABNORMAL
COCAINE UR QL: NEGATIVE
COLOR UR: YELLOW
DEPRECATED RDW RBC AUTO: 45.9 FL (ref 36.4–46.3)
EOSINOPHIL # BLD AUTO: 0.4 10*3/MM3 (ref 0–0.7)
EOSINOPHIL NFR BLD AUTO: 3 % (ref 0–7)
ERYTHROCYTE [DISTWIDTH] IN BLOOD BY AUTOMATED COUNT: 13.9 % (ref 11.5–14.5)
GLUCOSE UR STRIP-MCNC: NEGATIVE MG/DL
HCT VFR BLD AUTO: 37 % (ref 35–45)
HGB BLD-MCNC: 12.6 G/DL (ref 12–15.5)
HGB UR QL STRIP.AUTO: NEGATIVE
IMM GRANULOCYTES # BLD: 0.04 10*3/MM3 (ref 0–0.02)
IMM GRANULOCYTES NFR BLD: 0.3 % (ref 0–0.5)
KETONES UR QL STRIP: NEGATIVE
LEUKOCYTE ESTERASE UR QL STRIP.AUTO: NEGATIVE
LYMPHOCYTES # BLD AUTO: 3.08 10*3/MM3 (ref 0.6–4.2)
LYMPHOCYTES NFR BLD AUTO: 23.5 % (ref 10–50)
Lab: NORMAL
MCH RBC QN AUTO: 30.7 PG (ref 26.5–34)
MCHC RBC AUTO-ENTMCNC: 34.1 G/DL (ref 31.4–36)
MCV RBC AUTO: 90.2 FL (ref 80–98)
METHADONE UR QL SCN: NEGATIVE
MONOCYTES # BLD AUTO: 0.72 10*3/MM3 (ref 0–0.9)
MONOCYTES NFR BLD AUTO: 5.5 % (ref 0–12)
NEUTROPHILS # BLD AUTO: 8.82 10*3/MM3 (ref 2–8.6)
NEUTROPHILS NFR BLD AUTO: 67.2 % (ref 37–80)
NITRITE UR QL STRIP: NEGATIVE
OPIATES UR QL: NEGATIVE
OXYCODONE UR QL SCN: NEGATIVE
PH UR STRIP.AUTO: 8 [PH] (ref 5–9)
PLATELET # BLD AUTO: 247 10*3/MM3 (ref 150–450)
PMV BLD AUTO: 10.6 FL (ref 8–12)
PROT UR QL STRIP: ABNORMAL
RBC # BLD AUTO: 4.1 10*6/MM3 (ref 3.77–5.16)
RH BLD: POSITIVE
SP GR UR STRIP: 1.02 (ref 1–1.03)
UROBILINOGEN UR QL STRIP: ABNORMAL
WBC NRBC COR # BLD: 13.13 10*3/MM3 (ref 3.2–9.8)

## 2018-04-30 PROCEDURE — 80307 DRUG TEST PRSMV CHEM ANLYZR: CPT | Performed by: OBSTETRICS & GYNECOLOGY

## 2018-04-30 PROCEDURE — 36415 COLL VENOUS BLD VENIPUNCTURE: CPT | Performed by: OBSTETRICS & GYNECOLOGY

## 2018-04-30 PROCEDURE — G0432 EIA HIV-1/HIV-2 SCREEN: HCPCS | Performed by: OBSTETRICS & GYNECOLOGY

## 2018-04-30 PROCEDURE — 87340 HEPATITIS B SURFACE AG IA: CPT | Performed by: OBSTETRICS & GYNECOLOGY

## 2018-04-30 PROCEDURE — 85025 COMPLETE CBC W/AUTO DIFF WBC: CPT | Performed by: OBSTETRICS & GYNECOLOGY

## 2018-04-30 PROCEDURE — 87086 URINE CULTURE/COLONY COUNT: CPT | Performed by: OBSTETRICS & GYNECOLOGY

## 2018-04-30 PROCEDURE — 86762 RUBELLA ANTIBODY: CPT | Performed by: OBSTETRICS & GYNECOLOGY

## 2018-04-30 PROCEDURE — 86850 RBC ANTIBODY SCREEN: CPT | Performed by: OBSTETRICS & GYNECOLOGY

## 2018-04-30 PROCEDURE — 86901 BLOOD TYPING SEROLOGIC RH(D): CPT | Performed by: OBSTETRICS & GYNECOLOGY

## 2018-04-30 PROCEDURE — 86803 HEPATITIS C AB TEST: CPT | Performed by: OBSTETRICS & GYNECOLOGY

## 2018-04-30 PROCEDURE — 86900 BLOOD TYPING SEROLOGIC ABO: CPT | Performed by: OBSTETRICS & GYNECOLOGY

## 2018-04-30 PROCEDURE — 99213 OFFICE O/P EST LOW 20 MIN: CPT | Performed by: OBSTETRICS & GYNECOLOGY

## 2018-04-30 PROCEDURE — 81003 URINALYSIS AUTO W/O SCOPE: CPT | Performed by: OBSTETRICS & GYNECOLOGY

## 2018-04-30 RX ORDER — ONDANSETRON 4 MG/1
4 TABLET, ORALLY DISINTEGRATING ORAL EVERY 6 HOURS PRN
Qty: 30 TABLET | Refills: 3 | Status: SHIPPED | OUTPATIENT
Start: 2018-04-30 | End: 2018-09-04
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
## 2018-05-01 LAB
BACTERIA SPEC AEROBE CULT: NORMAL
BACTERIA SPEC AEROBE CULT: NORMAL

## 2018-05-02 LAB
HBV SURFACE AG SERPL QL IA: NEGATIVE
HCV AB SER DONR QL: NEGATIVE
HIV1+2 AB SER QL: NEGATIVE
RUBV IGG SER QL: ABNORMAL
RUBV IGG SER-ACNC: 12 IU/ML (ref 0–9.9)

## 2018-05-05 LAB — RPR SER QL: NORMAL

## 2018-05-09 ENCOUNTER — INITIAL PRENATAL (OUTPATIENT)
Dept: OBSTETRICS AND GYNECOLOGY | Facility: CLINIC | Age: 23
End: 2018-05-09

## 2018-05-09 VITALS — DIASTOLIC BLOOD PRESSURE: 70 MMHG | BODY MASS INDEX: 28.17 KG/M2 | WEIGHT: 188 LBS | SYSTOLIC BLOOD PRESSURE: 102 MMHG

## 2018-05-09 DIAGNOSIS — Z34.90 THIRD PREGNANCY: ICD-10-CM

## 2018-05-09 DIAGNOSIS — Z32.01 PREGNANCY TEST PERFORMED, PREGNANCY CONFIRMED: Primary | ICD-10-CM

## 2018-05-09 DIAGNOSIS — Z3A.10 10 WEEKS GESTATION OF PREGNANCY: ICD-10-CM

## 2018-05-09 DIAGNOSIS — O34.211 MATERNAL CARE DUE TO LOW TRANSVERSE UTERINE SCAR FROM PREVIOUS CESAREAN DELIVERY: ICD-10-CM

## 2018-05-09 LAB
CANDIDA ALBICANS: NEGATIVE
GARDNERELLA VAGINALIS: POSITIVE
TRICHOMONAS VAGINALIS PCR: NEGATIVE

## 2018-05-09 PROCEDURE — 87491 CHLMYD TRACH DNA AMP PROBE: CPT | Performed by: OBSTETRICS & GYNECOLOGY

## 2018-05-09 PROCEDURE — 87660 TRICHOMONAS VAGIN DIR PROBE: CPT | Performed by: OBSTETRICS & GYNECOLOGY

## 2018-05-09 PROCEDURE — 87591 N.GONORRHOEAE DNA AMP PROB: CPT | Performed by: OBSTETRICS & GYNECOLOGY

## 2018-05-09 PROCEDURE — 99213 OFFICE O/P EST LOW 20 MIN: CPT | Performed by: OBSTETRICS & GYNECOLOGY

## 2018-05-09 PROCEDURE — 87661 TRICHOMONAS VAGINALIS AMPLIF: CPT | Performed by: OBSTETRICS & GYNECOLOGY

## 2018-05-09 PROCEDURE — 87480 CANDIDA DNA DIR PROBE: CPT | Performed by: OBSTETRICS & GYNECOLOGY

## 2018-05-09 PROCEDURE — 87510 GARDNER VAG DNA DIR PROBE: CPT | Performed by: OBSTETRICS & GYNECOLOGY

## 2018-05-09 NOTE — PROGRESS NOTES
Discussed dietary considerations and weight gain in pregnancy  Encouraged breastfeeding  Last pregnancy was delivered at 36 wk with limited prenatal care, baby was severely growth restricted with REDF.  Poor weight gain with last pregnancy  Nausea is well controlled with Zofran  Prior C/S x 1, plans for repeat  Requests cell free fetal DNA testing  Reviewed prenatal labs  Reviewed US results including EDC:   A/P 23 yo  at 10+6 for prenatal visit  Cell free fetal DNA testing  F/u 4 wk for next prenatal appt  Cell free fetal DNA today  Vaginitis and GC/CT today

## 2018-05-10 LAB
C TRACH RRNA CVX QL NAA+PROBE: NEGATIVE
N GONORRHOEA RRNA SPEC QL NAA+PROBE: NEGATIVE
T VAGINALIS DNA VAG QL PROBE+SIG AMP: NEGATIVE

## 2018-06-06 ENCOUNTER — ROUTINE PRENATAL (OUTPATIENT)
Dept: OBSTETRICS AND GYNECOLOGY | Facility: CLINIC | Age: 23
End: 2018-06-06

## 2018-06-06 VITALS — WEIGHT: 195 LBS | SYSTOLIC BLOOD PRESSURE: 104 MMHG | DIASTOLIC BLOOD PRESSURE: 64 MMHG | BODY MASS INDEX: 29.22 KG/M2

## 2018-06-06 DIAGNOSIS — O34.211 MATERNAL CARE DUE TO LOW TRANSVERSE UTERINE SCAR FROM PREVIOUS CESAREAN DELIVERY: Primary | ICD-10-CM

## 2018-06-06 DIAGNOSIS — Z3A.14 14 WEEKS GESTATION OF PREGNANCY: ICD-10-CM

## 2018-06-06 DIAGNOSIS — Z36.89 ENCOUNTER FOR FETAL ANATOMIC SURVEY: ICD-10-CM

## 2018-06-06 PROCEDURE — 99213 OFFICE O/P EST LOW 20 MIN: CPT | Performed by: OBSTETRICS & GYNECOLOGY

## 2018-06-06 NOTE — PROGRESS NOTES
Patient reports frequent dizziness  Nausea is well controlled  Dizziness improves with juice  No VB or LOF, no vaginal discharge  A/P 24 yo  at 14+6 for prenatal visit  Pregnancy is complicated by history of miscarriage, history of fetal growth restriction, h/o PTD and prior C/S x 1.  F/u 4 wk for next prenatal appt and anatomy US  Encouraged hydration

## 2018-06-21 ENCOUNTER — DOCUMENTATION (OUTPATIENT)
Dept: OBSTETRICS AND GYNECOLOGY | Facility: CLINIC | Age: 23
End: 2018-06-21

## 2018-07-10 ENCOUNTER — ROUTINE PRENATAL (OUTPATIENT)
Dept: OBSTETRICS AND GYNECOLOGY | Facility: CLINIC | Age: 23
End: 2018-07-10

## 2018-07-10 VITALS — BODY MASS INDEX: 29.95 KG/M2 | WEIGHT: 197 LBS | DIASTOLIC BLOOD PRESSURE: 68 MMHG | SYSTOLIC BLOOD PRESSURE: 100 MMHG

## 2018-07-10 DIAGNOSIS — Z3A.19 19 WEEKS GESTATION OF PREGNANCY: ICD-10-CM

## 2018-07-10 DIAGNOSIS — O34.211 MATERNAL CARE DUE TO LOW TRANSVERSE UTERINE SCAR FROM PREVIOUS CESAREAN DELIVERY: Primary | ICD-10-CM

## 2018-07-10 PROCEDURE — 99213 OFFICE O/P EST LOW 20 MIN: CPT | Performed by: OBSTETRICS & GYNECOLOGY

## 2018-07-10 NOTE — PROGRESS NOTES
Dizziness continues to improve  Nausea is well controlled  Thinks that she's felt the baby move once  No VB or LOF  A/P 22 yo  at 19+5 for prenatal visit  Pregnancy is complicated by history of miscarriage, history of fetal growth restriction, h/o PTD and prior C/S x1  F/u 4 wk for next prenatal appt  1 hr GTT at next appt

## 2018-08-02 DIAGNOSIS — Z34.82 PRENATAL CARE, SUBSEQUENT PREGNANCY, SECOND TRIMESTER: Primary | ICD-10-CM

## 2018-08-07 ENCOUNTER — ROUTINE PRENATAL (OUTPATIENT)
Dept: OBSTETRICS AND GYNECOLOGY | Facility: CLINIC | Age: 23
End: 2018-08-07

## 2018-08-07 ENCOUNTER — APPOINTMENT (OUTPATIENT)
Dept: LAB | Facility: HOSPITAL | Age: 23
End: 2018-08-07

## 2018-08-07 VITALS — DIASTOLIC BLOOD PRESSURE: 62 MMHG | BODY MASS INDEX: 30.41 KG/M2 | WEIGHT: 200 LBS | SYSTOLIC BLOOD PRESSURE: 104 MMHG

## 2018-08-07 DIAGNOSIS — Z3A.23 23 WEEKS GESTATION OF PREGNANCY: ICD-10-CM

## 2018-08-07 DIAGNOSIS — Z34.82 PRENATAL CARE, SUBSEQUENT PREGNANCY, SECOND TRIMESTER: ICD-10-CM

## 2018-08-07 DIAGNOSIS — O34.211 MATERNAL CARE DUE TO LOW TRANSVERSE UTERINE SCAR FROM PREVIOUS CESAREAN DELIVERY: Primary | ICD-10-CM

## 2018-08-07 LAB
BASOPHILS # BLD AUTO: 0.03 10*3/MM3 (ref 0–0.2)
BASOPHILS NFR BLD AUTO: 0.2 % (ref 0–2)
DEPRECATED RDW RBC AUTO: 46.8 FL (ref 36.4–46.3)
EOSINOPHIL # BLD AUTO: 0.29 10*3/MM3 (ref 0–0.7)
EOSINOPHIL NFR BLD AUTO: 2.4 % (ref 0–7)
ERYTHROCYTE [DISTWIDTH] IN BLOOD BY AUTOMATED COUNT: 14.1 % (ref 11.5–14.5)
GLUCOSE 1H P 100 G GLC PO SERPL-MCNC: 98 MG/DL (ref 60–140)
HCT VFR BLD AUTO: 33.8 % (ref 35–45)
HGB BLD-MCNC: 11.7 G/DL (ref 12–15.5)
IMM GRANULOCYTES # BLD: 0.03 10*3/MM3 (ref 0–0.02)
IMM GRANULOCYTES NFR BLD: 0.2 % (ref 0–0.5)
LYMPHOCYTES # BLD AUTO: 2.74 10*3/MM3 (ref 0.6–4.2)
LYMPHOCYTES NFR BLD AUTO: 22.5 % (ref 10–50)
MCH RBC QN AUTO: 31.4 PG (ref 26.5–34)
MCHC RBC AUTO-ENTMCNC: 34.6 G/DL (ref 31.4–36)
MCV RBC AUTO: 90.6 FL (ref 80–98)
MONOCYTES # BLD AUTO: 0.62 10*3/MM3 (ref 0–0.9)
MONOCYTES NFR BLD AUTO: 5.1 % (ref 0–12)
NEUTROPHILS # BLD AUTO: 8.45 10*3/MM3 (ref 2–8.6)
NEUTROPHILS NFR BLD AUTO: 69.6 % (ref 37–80)
PLATELET # BLD AUTO: 169 10*3/MM3 (ref 150–450)
PMV BLD AUTO: 10.9 FL (ref 8–12)
RBC # BLD AUTO: 3.73 10*6/MM3 (ref 3.77–5.16)
WBC NRBC COR # BLD: 12.16 10*3/MM3 (ref 3.2–9.8)

## 2018-08-07 PROCEDURE — 85025 COMPLETE CBC W/AUTO DIFF WBC: CPT | Performed by: OBSTETRICS & GYNECOLOGY

## 2018-08-07 PROCEDURE — 36415 COLL VENOUS BLD VENIPUNCTURE: CPT | Performed by: OBSTETRICS & GYNECOLOGY

## 2018-08-07 PROCEDURE — 82950 GLUCOSE TEST: CPT | Performed by: OBSTETRICS & GYNECOLOGY

## 2018-08-07 PROCEDURE — 99213 OFFICE O/P EST LOW 20 MIN: CPT | Performed by: OBSTETRICS & GYNECOLOGY

## 2018-08-07 NOTE — PROGRESS NOTES
Notes good FM  No VB or LOF  A/P 24 yo  at 23+5 for prenatal visit  Pregnancy is complicated by history of miscarriage, history of fetal growth restriction, h/o PTD and prior C/S x1  F/u 4 wk for next prenatal appt  1 hr GTT today

## 2018-08-08 ENCOUNTER — TELEPHONE (OUTPATIENT)
Dept: OBSTETRICS AND GYNECOLOGY | Facility: CLINIC | Age: 23
End: 2018-08-08

## 2018-08-08 NOTE — TELEPHONE ENCOUNTER
----- Message from Adrianne Rhodes sent at 8/8/2018 12:37 PM CDT -----  Contact: [phone]  Pt called and was wondering if she missed a call from you about her lab results. She would like a call back please.    Thanks    I called this patient and informed her that her glucose test came back normal.

## 2018-09-11 ENCOUNTER — ROUTINE PRENATAL (OUTPATIENT)
Dept: OBSTETRICS AND GYNECOLOGY | Facility: CLINIC | Age: 23
End: 2018-09-11

## 2018-09-11 VITALS — BODY MASS INDEX: 32.11 KG/M2 | DIASTOLIC BLOOD PRESSURE: 78 MMHG | SYSTOLIC BLOOD PRESSURE: 108 MMHG | WEIGHT: 205 LBS

## 2018-09-11 DIAGNOSIS — O34.211 MATERNAL CARE DUE TO LOW TRANSVERSE UTERINE SCAR FROM PREVIOUS CESAREAN DELIVERY: Primary | ICD-10-CM

## 2018-09-11 DIAGNOSIS — Z3A.28 28 WEEKS GESTATION OF PREGNANCY: ICD-10-CM

## 2018-09-11 PROCEDURE — 99213 OFFICE O/P EST LOW 20 MIN: CPT | Performed by: OBSTETRICS & GYNECOLOGY

## 2018-10-10 ENCOUNTER — ROUTINE PRENATAL (OUTPATIENT)
Dept: OBSTETRICS AND GYNECOLOGY | Facility: CLINIC | Age: 23
End: 2018-10-10

## 2018-10-10 ENCOUNTER — HOSPITAL ENCOUNTER (OUTPATIENT)
Facility: HOSPITAL | Age: 23
Setting detail: SURGERY ADMIT
End: 2018-10-10
Attending: OBSTETRICS & GYNECOLOGY | Admitting: OBSTETRICS & GYNECOLOGY

## 2018-10-10 VITALS — WEIGHT: 203 LBS | SYSTOLIC BLOOD PRESSURE: 123 MMHG | BODY MASS INDEX: 31.79 KG/M2 | DIASTOLIC BLOOD PRESSURE: 82 MMHG

## 2018-10-10 DIAGNOSIS — O34.211 MATERNAL CARE DUE TO LOW TRANSVERSE UTERINE SCAR FROM PREVIOUS CESAREAN DELIVERY: Primary | ICD-10-CM

## 2018-10-10 DIAGNOSIS — Z3A.32 32 WEEKS GESTATION OF PREGNANCY: ICD-10-CM

## 2018-10-10 PROCEDURE — 99212 OFFICE O/P EST SF 10 MIN: CPT | Performed by: OBSTETRICS & GYNECOLOGY

## 2018-10-10 NOTE — PROGRESS NOTES
KALEB visit    HPI:  Occasional pressure, no cramping or ctx.  +FM.  No LOF or vb.  Does want a repeat section.      PE - see vitals    A/P: 24 yo  @ 32w6d presents for KALEB visit.   1. Continue routine prenatal care  - Encourage PNV  - PTL precautions   - B+, RI, 1hr wnl  - Declined genetic testing   - RTC in 4 weeks.     2. Previuos c/section   - Desires repeat, will scheduled on      3. H/o PTB  - 17OHP not indicated given indication was IUGR.     Farzana HARTMAN Friday

## 2018-10-24 ENCOUNTER — ROUTINE PRENATAL (OUTPATIENT)
Dept: OBSTETRICS AND GYNECOLOGY | Facility: CLINIC | Age: 23
End: 2018-10-24

## 2018-10-24 VITALS — DIASTOLIC BLOOD PRESSURE: 73 MMHG | WEIGHT: 209 LBS | SYSTOLIC BLOOD PRESSURE: 115 MMHG | BODY MASS INDEX: 32.73 KG/M2

## 2018-10-24 DIAGNOSIS — Z3A.34 34 WEEKS GESTATION OF PREGNANCY: ICD-10-CM

## 2018-10-24 DIAGNOSIS — O34.211 MATERNAL CARE DUE TO LOW TRANSVERSE UTERINE SCAR FROM PREVIOUS CESAREAN DELIVERY: Primary | ICD-10-CM

## 2018-10-24 DIAGNOSIS — O09.893 HISTORY OF PRETERM DELIVERY, CURRENTLY PREGNANT IN THIRD TRIMESTER: ICD-10-CM

## 2018-10-24 PROCEDURE — 99213 OFFICE O/P EST LOW 20 MIN: CPT | Performed by: OBSTETRICS & GYNECOLOGY

## 2018-10-24 NOTE — PROGRESS NOTES
KALEB visit    HPI:  Having some hip and pelvic pain.  No cramping or ctx.  No LOF or vb.  Has had more discharge, but no odor or itching.  +FM.      PE - see vitals    A/P: 22 yo  @ 34w6d presents for KALEB visit.   1. Continue routine prenatal care  - Encourage PNV  - PTL precautions   - B+, RI, 1hr wnl  - Declined genetic testing   - Discussed use of pregnancy belt/griddle; discussed supportive measures as well.    - RTC in 1 week with GBS next week     2. Previous c/section   - Desires repeat, scheduled on  @ 710     3. H/o PTB  - 17OHP not indicated given indication was IUGR.      Farzana HARTMAN Friday

## 2018-11-01 ENCOUNTER — ROUTINE PRENATAL (OUTPATIENT)
Dept: OBSTETRICS AND GYNECOLOGY | Facility: CLINIC | Age: 23
End: 2018-11-01

## 2018-11-01 VITALS — WEIGHT: 206 LBS | SYSTOLIC BLOOD PRESSURE: 108 MMHG | DIASTOLIC BLOOD PRESSURE: 73 MMHG | BODY MASS INDEX: 32.26 KG/M2

## 2018-11-01 DIAGNOSIS — Z3A.36 36 WEEKS GESTATION OF PREGNANCY: ICD-10-CM

## 2018-11-01 DIAGNOSIS — R12 HEARTBURN DURING PREGNANCY IN THIRD TRIMESTER: ICD-10-CM

## 2018-11-01 DIAGNOSIS — O34.211 MATERNAL CARE DUE TO LOW TRANSVERSE UTERINE SCAR FROM PREVIOUS CESAREAN DELIVERY: ICD-10-CM

## 2018-11-01 DIAGNOSIS — O26.893 HEARTBURN DURING PREGNANCY IN THIRD TRIMESTER: ICD-10-CM

## 2018-11-01 DIAGNOSIS — Z36.85 ANTENATAL SCREENING FOR STREPTOCOCCUS B: Primary | ICD-10-CM

## 2018-11-01 DIAGNOSIS — O09.893 HISTORY OF PRETERM DELIVERY, CURRENTLY PREGNANT IN THIRD TRIMESTER: ICD-10-CM

## 2018-11-01 PROCEDURE — 87653 STREP B DNA AMP PROBE: CPT | Performed by: NURSE PRACTITIONER

## 2018-11-01 PROCEDURE — 99213 OFFICE O/P EST LOW 20 MIN: CPT | Performed by: NURSE PRACTITIONER

## 2018-11-01 RX ORDER — RANITIDINE 150 MG/1
150 TABLET ORAL 2 TIMES DAILY
Qty: 60 TABLET | Refills: 2 | Status: SHIPPED | OUTPATIENT
Start: 2018-11-01 | End: 2018-11-23 | Stop reason: HOSPADM

## 2018-11-01 NOTE — PROGRESS NOTES
KALEB visit; hx reviewed, no changes   HPI:  Having some hip and pelvic pain.  No cramping or ctx.  No LOF or vb. +FM.  Reports heartburn unrelieved by Tums.     PE - see vitals     A/P: 24 yo  @ 36w0d presents for KALEB visit.   1. Continue routine prenatal care  - Encourage PNV  - PTL precautions   - B+, RI, 1hr wnl, GBS today  - Counsyl test negative on    - RTC in 1 week     2. Previous c/section   - Desires repeat, scheduled on  @ 710     3. H/o PTB  - 17OHP not indicated given indication was IUGR.

## 2018-11-02 LAB — GROUP B STREP, DNA: POSITIVE

## 2018-11-07 ENCOUNTER — TELEPHONE (OUTPATIENT)
Dept: OBSTETRICS AND GYNECOLOGY | Facility: CLINIC | Age: 23
End: 2018-11-07

## 2018-11-07 ENCOUNTER — ROUTINE PRENATAL (OUTPATIENT)
Dept: OBSTETRICS AND GYNECOLOGY | Facility: CLINIC | Age: 23
End: 2018-11-07

## 2018-11-07 VITALS — BODY MASS INDEX: 32.58 KG/M2 | SYSTOLIC BLOOD PRESSURE: 131 MMHG | WEIGHT: 208 LBS | DIASTOLIC BLOOD PRESSURE: 82 MMHG

## 2018-11-07 DIAGNOSIS — O26.893 HEARTBURN DURING PREGNANCY IN THIRD TRIMESTER: ICD-10-CM

## 2018-11-07 DIAGNOSIS — O09.893 HISTORY OF PRETERM DELIVERY, CURRENTLY PREGNANT IN THIRD TRIMESTER: Primary | ICD-10-CM

## 2018-11-07 DIAGNOSIS — R12 HEARTBURN DURING PREGNANCY IN THIRD TRIMESTER: ICD-10-CM

## 2018-11-07 DIAGNOSIS — Z3A.36 36 WEEKS GESTATION OF PREGNANCY: ICD-10-CM

## 2018-11-07 DIAGNOSIS — O34.211 MATERNAL CARE DUE TO LOW TRANSVERSE UTERINE SCAR FROM PREVIOUS CESAREAN DELIVERY: ICD-10-CM

## 2018-11-07 PROCEDURE — 99212 OFFICE O/P EST SF 10 MIN: CPT | Performed by: ADVANCED PRACTICE MIDWIFE
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
## 2018-11-10 ENCOUNTER — HOSPITAL ENCOUNTER (OUTPATIENT)
Facility: HOSPITAL | Age: 23
Discharge: HOME | End: 2018-11-10
Attending: OBSTETRICS & GYNECOLOGY | Admitting: OBSTETRICS & GYNECOLOGY

## 2018-11-10 VITALS
RESPIRATION RATE: 18 BRPM | DIASTOLIC BLOOD PRESSURE: 69 MMHG | HEART RATE: 104 BPM | OXYGEN SATURATION: 97 % | TEMPERATURE: 97.8 F | SYSTOLIC BLOOD PRESSURE: 118 MMHG

## 2018-11-10 LAB
BACTERIA UR QL AUTO: ABNORMAL /HPF
BILIRUB UR QL STRIP: NEGATIVE
CLARITY UR: ABNORMAL
COLOR UR: ABNORMAL
GLUCOSE UR STRIP-MCNC: NEGATIVE MG/DL
HGB UR QL STRIP.AUTO: NEGATIVE
HYALINE CASTS UR QL AUTO: ABNORMAL /LPF
KETONES UR QL STRIP: NEGATIVE
LEUKOCYTE ESTERASE UR QL STRIP.AUTO: ABNORMAL
NITRITE UR QL STRIP: POSITIVE
PH UR STRIP.AUTO: 6.5 [PH] (ref 5–9)
PROT UR QL STRIP: ABNORMAL
RBC # UR: ABNORMAL /HPF
REF LAB TEST METHOD: ABNORMAL
SP GR UR STRIP: 1.02 (ref 1–1.03)
SQUAMOUS #/AREA URNS HPF: ABNORMAL /HPF
UROBILINOGEN UR QL STRIP: ABNORMAL
WBC UR QL AUTO: ABNORMAL /HPF

## 2018-11-10 PROCEDURE — 59025 FETAL NON-STRESS TEST: CPT

## 2018-11-10 PROCEDURE — 87077 CULTURE AEROBIC IDENTIFY: CPT | Performed by: STUDENT IN AN ORGANIZED HEALTH CARE EDUCATION/TRAINING PROGRAM

## 2018-11-10 PROCEDURE — 81001 URINALYSIS AUTO W/SCOPE: CPT | Performed by: STUDENT IN AN ORGANIZED HEALTH CARE EDUCATION/TRAINING PROGRAM

## 2018-11-10 PROCEDURE — 87086 URINE CULTURE/COLONY COUNT: CPT | Performed by: STUDENT IN AN ORGANIZED HEALTH CARE EDUCATION/TRAINING PROGRAM

## 2018-11-10 PROCEDURE — 87186 SC STD MICRODIL/AGAR DIL: CPT | Performed by: STUDENT IN AN ORGANIZED HEALTH CARE EDUCATION/TRAINING PROGRAM

## 2018-11-10 PROCEDURE — G0463 HOSPITAL OUTPT CLINIC VISIT: HCPCS

## 2018-11-10 NOTE — NURSING NOTE
RN at bedside to give discharge instructions. Bactrim prescription given to pt. Pt instructed to fill and take full prescription to treat UTI. Pt given instructions for reasons to return to unit. Pt verbalized understanding and ambulated to personal vehicle with partner.

## 2018-11-10 NOTE — NON STRESS TEST
Zita Haas, a  at 37w2d with an ROCHELLE of 2018, by Ultrasound, was seen at [facility] LABOR DELIVERY for a nonstress test.    Chief Complaint   Patient presents with   • Back Pain     constant since  last night 2018   • other     pt reports normal fetal movement, denies vaginal bleeding   • leaking of fluid     pt unsure, reports spontaneous leaking of clear fluid running down her leg last night 2200 2018       Patient Active Problem List   Diagnosis   • Maternal care due to low transverse uterine scar from previous  delivery   • History of  delivery, currently pregnant in third trimester   • Heartburn during pregnancy in third trimester       Start Time: 1436  Stop Time: 1456    Interpretation A  Nonstress Test Interpretation A: Reactive (11/10/18 1456 : Shiv Gonzalez, RN)  Comments A: reviewed with Dr. Barajas  (11/10/18 1456 : Shiv Gonzalez, RN)    Amniswab negative. Ferning negative. No fluid noted in vaginal canal on exam. Unable to reach cervix d/t posterior location. Dr. Barajas aware. UA sent. Pt treated with Bactrim for UTI. Ready for discharge.

## 2018-11-10 NOTE — PROGRESS NOTES
[facility]  Zita Haas  : 1995  MRN: [id number]  CSN: [id number]    L&D Triage note    Subjective  24 yo  presents to triage for back pain. Pain is sore in paraspinous muscles. Patient has mild scoliosis and has occasional back pain similar to current. Pain worse with movement. Denies f/c/ns. No urinary sx. Episode of fluid running down leg yesterday, otherwise no leaking fluid, contractions, or vaginal bleeding. Reports regular fetal movement.     Min/max vitals past 24 hours:  Temp  Min: 97.8 °F (36.6 °C)  Max: 97.8 °F (36.6 °C)   BP  Min: 118/69  Max: 118/69   Pulse  Min: 104  Max: 104   Resp  Min: 18  Max: 18    Exam:    gen- wd wn nad  abd- s nt gravid  Back- reproducible tenderness to palpation on paraspinous muscles. No CVA tenderness. No tenderness on spinous processes.   SCE: CL/L/H    Lab Results (most recent)     Procedure Component Value Units Date/Time    Urinalysis, Microscopic Only - Urine, Clean Catch [[id number]]  (Abnormal) Collected:  11/10/18 1404    Specimen:  Urine, Clean Catch Updated:  11/10/18 1515     RBC, UA 0-2 /HPF      WBC, UA 13-20 /HPF      Bacteria, UA 3+ /HPF      Squamous Epithelial Cells, UA Too Numerous to Count /HPF      Hyaline Casts, UA None Seen /LPF      Methodology Manual Light Microscopy    Urine Culture - Urine, Urine, Clean Catch [[id number]] Collected:  11/10/18 140    Specimen:  Urine, Clean Catch Updated:  11/10/18 1515    Urinalysis With Culture If Indicated - Urine, Clean Catch [[id number]]  (Abnormal) Collected:  11/10/18 140    Specimen:  Urine, Clean Catch Updated:  11/10/18 1417     Color, UA Dark Yellow     Appearance, UA Cloudy     pH, UA 6.5     Specific Gravity, UA 1.020     Glucose, UA Negative     Ketones, UA Negative     Bilirubin, UA Negative     Blood, UA Negative     Protein, UA Trace     Leuk Esterase, UA Small (1+)     Nitrite, UA Positive     Urobilinogen, UA 1.0 E.U./dL        NST: Reactive NST  TOCO: absent ctx      Neg  amniosure        Assessment  1. 22 yo  at 37w1d with back pain likely MSK in nature given exam findings and clinical presentation. Low suspicion for spinous/infectious etiology. Low suspicion for pyelo- no cva tenderness and no urinary sx. UA concerning for a lower urinary tract infection. Patient counseled on conservative measures for back pain in pregnancy. Otherwise, RNST, VSWNL and exam benign    Plan  1. Rx for bactrim for UTI  2. Counseled on stretching exercises and head/ice  3. Return precautions  4. Follow up with routine OB            This document has been electronically signed by Tab Barajas MD on November 10, 2018 3:16 PM

## 2018-11-11 NOTE — PROGRESS NOTES
CC: KALEB visit, history reviewed, no changes noted    ROS:Positive no complaits   Negative leaking fluid from the vagina, swelling in her legs, headache, visual changes, low back pain and heartburn      Educated on:FKC, VB, labor signs    A/Plan: f/u in 1 week/s   Zita was seen today for routine prenatal visit.    Diagnoses and all orders for this visit:    History of  delivery, currently pregnant in third trimester    Maternal care due to low transverse uterine scar from previous  delivery    Heartburn during pregnancy in third trimester    36 weeks gestation of pregnancy

## 2018-11-12 LAB — BACTERIA SPEC AEROBE CULT: ABNORMAL

## 2018-11-13 RX ORDER — NITROFURANTOIN 25; 75 MG/1; MG/1
100 CAPSULE ORAL 2 TIMES DAILY
Qty: 20 CAPSULE | Refills: 0 | Status: SHIPPED | OUTPATIENT
Start: 2018-11-13 | End: 2018-11-19

## 2018-11-19 ENCOUNTER — ROUTINE PRENATAL (OUTPATIENT)
Dept: OBSTETRICS AND GYNECOLOGY | Facility: CLINIC | Age: 23
End: 2018-11-19

## 2018-11-19 VITALS — BODY MASS INDEX: 33.67 KG/M2 | DIASTOLIC BLOOD PRESSURE: 74 MMHG | SYSTOLIC BLOOD PRESSURE: 107 MMHG | WEIGHT: 215 LBS

## 2018-11-19 DIAGNOSIS — Z3A.38 38 WEEKS GESTATION OF PREGNANCY: ICD-10-CM

## 2018-11-19 DIAGNOSIS — O34.211 MATERNAL CARE DUE TO LOW TRANSVERSE UTERINE SCAR FROM PREVIOUS CESAREAN DELIVERY: Primary | ICD-10-CM

## 2018-11-19 PROCEDURE — 99213 OFFICE O/P EST LOW 20 MIN: CPT | Performed by: OBSTETRICS & GYNECOLOGY

## 2018-11-19 RX ORDER — ONDANSETRON 4 MG/1
4 TABLET, FILM COATED ORAL EVERY 6 HOURS PRN
Status: CANCELLED | OUTPATIENT
Start: 2018-11-19

## 2018-11-19 RX ORDER — PROMETHAZINE HYDROCHLORIDE 25 MG/ML
12.5 INJECTION, SOLUTION INTRAMUSCULAR; INTRAVENOUS EVERY 6 HOURS PRN
Status: CANCELLED | OUTPATIENT
Start: 2018-11-19

## 2018-11-19 RX ORDER — SODIUM CHLORIDE 0.9 % (FLUSH) 0.9 %
3-10 SYRINGE (ML) INJECTION AS NEEDED
Status: CANCELLED | OUTPATIENT
Start: 2018-11-19

## 2018-11-19 RX ORDER — SODIUM CHLORIDE 0.9 % (FLUSH) 0.9 %
3 SYRINGE (ML) INJECTION EVERY 12 HOURS SCHEDULED
Status: CANCELLED | OUTPATIENT
Start: 2018-11-19

## 2018-11-19 RX ORDER — TRISODIUM CITRATE DIHYDRATE AND CITRIC ACID MONOHYDRATE 500; 334 MG/5ML; MG/5ML
30 SOLUTION ORAL ONCE
Status: CANCELLED | OUTPATIENT
Start: 2018-11-19 | End: 2018-11-19

## 2018-11-19 RX ORDER — LIDOCAINE HYDROCHLORIDE 10 MG/ML
5 INJECTION, SOLUTION EPIDURAL; INFILTRATION; INTRACAUDAL; PERINEURAL AS NEEDED
Status: CANCELLED | OUTPATIENT
Start: 2018-11-19

## 2018-11-19 RX ORDER — ONDANSETRON 2 MG/ML
4 INJECTION INTRAMUSCULAR; INTRAVENOUS EVERY 6 HOURS PRN
Status: CANCELLED | OUTPATIENT
Start: 2018-11-19

## 2018-11-19 RX ORDER — OXYTOCIN/RINGER'S LACTATE 20/1000 ML
999 PLASTIC BAG, INJECTION (ML) INTRAVENOUS ONCE
Status: CANCELLED | OUTPATIENT
Start: 2018-11-19 | End: 2018-11-19

## 2018-11-19 RX ORDER — PROMETHAZINE HYDROCHLORIDE 25 MG/1
12.5 SUPPOSITORY RECTAL EVERY 6 HOURS PRN
Status: CANCELLED | OUTPATIENT
Start: 2018-11-19

## 2018-11-19 RX ORDER — OXYCODONE AND ACETAMINOPHEN 10; 325 MG/1; MG/1
1 TABLET ORAL EVERY 4 HOURS PRN
Status: CANCELLED | OUTPATIENT
Start: 2018-11-19 | End: 2018-11-29

## 2018-11-19 RX ORDER — PROMETHAZINE HYDROCHLORIDE 25 MG/1
12.5 TABLET ORAL EVERY 6 HOURS PRN
Status: CANCELLED | OUTPATIENT
Start: 2018-11-19

## 2018-11-19 RX ORDER — DIPHENHYDRAMINE HCL 25 MG
25 CAPSULE ORAL NIGHTLY PRN
Status: CANCELLED | OUTPATIENT
Start: 2018-11-19

## 2018-11-19 RX ORDER — IBUPROFEN 200 MG
600 TABLET ORAL EVERY 6 HOURS PRN
Status: CANCELLED | OUTPATIENT
Start: 2018-11-19

## 2018-11-19 RX ORDER — ACETAMINOPHEN 325 MG/1
650 TABLET ORAL EVERY 4 HOURS PRN
Status: CANCELLED | OUTPATIENT
Start: 2018-11-19

## 2018-11-19 RX ORDER — OXYTOCIN/RINGER'S LACTATE 20/1000 ML
125 PLASTIC BAG, INJECTION (ML) INTRAVENOUS AS NEEDED
Status: CANCELLED | OUTPATIENT
Start: 2018-11-19 | End: 2018-11-20

## 2018-11-19 RX ORDER — DIPHENHYDRAMINE HYDROCHLORIDE 50 MG/ML
25 INJECTION INTRAMUSCULAR; INTRAVENOUS NIGHTLY PRN
Status: CANCELLED | OUTPATIENT
Start: 2018-11-19

## 2018-11-19 RX ORDER — SODIUM CHLORIDE, SODIUM LACTATE, POTASSIUM CHLORIDE, CALCIUM CHLORIDE 600; 310; 30; 20 MG/100ML; MG/100ML; MG/100ML; MG/100ML
125 INJECTION, SOLUTION INTRAVENOUS CONTINUOUS
Status: CANCELLED | OUTPATIENT
Start: 2018-11-19

## 2018-11-19 RX ORDER — ONDANSETRON 4 MG/1
4 TABLET, ORALLY DISINTEGRATING ORAL EVERY 6 HOURS PRN
Status: CANCELLED | OUTPATIENT
Start: 2018-11-19

## 2018-11-19 NOTE — PROGRESS NOTES
KALEB visit    HPI:  Has had some lower back pain.  No real contractions.  +FM.  No LOF or vb.   States she still wants to have a repeat section.      OB History      Para Term  AB Living    3 1   1 1 1    SAB TAB Ectopic Molar Multiple Live Births              1        Past Medical History:   Diagnosis Date   • Anxiety    • Bipolar disorder (CMS/HCC)    • Depression    • Migraine    • Post traumatic stress disorder (PTSD)    • Seizures (CMS/HCC)     remote h/o drug induced seizure   • Tobacco dependence syndrome      Past Surgical History:   Procedure Laterality Date   •  SECTION     • WISDOM TOOTH EXTRACTION       Social History     Socioeconomic History   • Marital status: Other     Spouse name: Not on file   • Number of children: Not on file   • Years of education: Not on file   • Highest education level: Not on file   Social Needs   • Financial resource strain: Not on file   • Food insecurity - worry: Not on file   • Food insecurity - inability: Not on file   • Transportation needs - medical: Not on file   • Transportation needs - non-medical: Not on file   Occupational History   • Not on file   Tobacco Use   • Smoking status: Current Every Day Smoker     Packs/day: 1.00     Types: Cigarettes   • Smokeless tobacco: Never Used   Substance and Sexual Activity   • Alcohol use: No   • Drug use: No   • Sexual activity: Yes     Partners: Male     Birth control/protection: None   Other Topics Concern   • Not on file   Social History Narrative   • Not on file     Family History   Problem Relation Age of Onset   • Endometriosis Mother    • Miscarriages / Stillbirths Mother         2   • Other Mother           mother had guillian barre   • Depression Sister    • Anxiety disorder Sister    • Ulcers Maternal Grandmother    • Heart defect Maternal Grandfather      Current Outpatient Medications on File Prior to Visit   Medication Sig   • nitrofurantoin, macrocrystal-monohydrate, (MACROBID) 100 MG capsule  Take 1 capsule by mouth 2 (Two) Times a Day for 10 days.   • PRENATAL GUMMY VITAMIN Chew 1 each Daily.   • raNITIdine (ZANTAC) 150 MG tablet Take 1 tablet by mouth 2 (Two) Times a Day.     No current facility-administered medications on file prior to visit.      Allergies   Allergen Reactions   • Nicotine Polacrilex Rash     patch     Review of Systems - General ROS: negative  Respiratory ROS: no cough, shortness of breath, or wheezing  Cardiovascular ROS: no chest pain or dyspnea on exertion  Gastrointestinal ROS: no abdominal pain, change in bowel habits, or black or bloody stools  Genito-Urinary ROS: no dysuria, trouble voiding, or hematuria    PE - see vitals  General - sitting on exam table, AAOx3  Abd - soft, nttp, gravid  Ext - no CT bilaterally     A/P: 24 yo  @ 38w4d presents for KALEB, scheduled for repeat section on  @ 710.  1. Repeat low transverse  section - discussed options with patient including possibility of TOLAC, patient declined, would like to proceed with RTLCS. Discussed the benefits and risk. Discussed the risk of infection, pain, scarring, and potential damage to surrounding organs including but not limited to bladder, bowel, ureters, nerves, arteries, and veins. Discussed the risk of bleeding and potentially need for additional medications to stop/control bleeding. Discussed that if bleeding was unable to be controlled by medications or additional procedures, there is a possibility that a hysterectomy would need to be performed.      Discussed with the patient the risk to any surgery in general, including but not limited to death, risk of anesthesia, for which patient will discuss more with Anesthesia the day of surgery, the potential development of a blood clot, and the potential development of a urinary tract infection or need for prolonged use of an indwelling catheter. Discussed the potential for reoperation and blood transfusion.     Discussed recovery times and what  to expect following surgery.  All questions and concerns were addressed. Consents to be signed upon admission to L&D.  Wipes given.  Knows to be NPO at midnight.     Farzana HARTMAN Frijordan

## 2018-11-19 NOTE — H&P (VIEW-ONLY)
KALEB visit    HPI:  Has had some lower back pain.  No real contractions.  +FM.  No LOF or vb.   States she still wants to have a repeat section.      OB History      Para Term  AB Living    3 1   1 1 1    SAB TAB Ectopic Molar Multiple Live Births              1        Past Medical History:   Diagnosis Date   • Anxiety    • Bipolar disorder (CMS/HCC)    • Depression    • Migraine    • Post traumatic stress disorder (PTSD)    • Seizures (CMS/HCC)     remote h/o drug induced seizure   • Tobacco dependence syndrome      Past Surgical History:   Procedure Laterality Date   •  SECTION     • WISDOM TOOTH EXTRACTION       Social History     Socioeconomic History   • Marital status: Other     Spouse name: Not on file   • Number of children: Not on file   • Years of education: Not on file   • Highest education level: Not on file   Social Needs   • Financial resource strain: Not on file   • Food insecurity - worry: Not on file   • Food insecurity - inability: Not on file   • Transportation needs - medical: Not on file   • Transportation needs - non-medical: Not on file   Occupational History   • Not on file   Tobacco Use   • Smoking status: Current Every Day Smoker     Packs/day: 1.00     Types: Cigarettes   • Smokeless tobacco: Never Used   Substance and Sexual Activity   • Alcohol use: No   • Drug use: No   • Sexual activity: Yes     Partners: Male     Birth control/protection: None   Other Topics Concern   • Not on file   Social History Narrative   • Not on file     Family History   Problem Relation Age of Onset   • Endometriosis Mother    • Miscarriages / Stillbirths Mother         2   • Other Mother           mother had guillian barre   • Depression Sister    • Anxiety disorder Sister    • Ulcers Maternal Grandmother    • Heart defect Maternal Grandfather      Current Outpatient Medications on File Prior to Visit   Medication Sig   • nitrofurantoin, macrocrystal-monohydrate, (MACROBID) 100 MG capsule  Take 1 capsule by mouth 2 (Two) Times a Day for 10 days.   • PRENATAL GUMMY VITAMIN Chew 1 each Daily.   • raNITIdine (ZANTAC) 150 MG tablet Take 1 tablet by mouth 2 (Two) Times a Day.     No current facility-administered medications on file prior to visit.      Allergies   Allergen Reactions   • Nicotine Polacrilex Rash     patch     Review of Systems - General ROS: negative  Respiratory ROS: no cough, shortness of breath, or wheezing  Cardiovascular ROS: no chest pain or dyspnea on exertion  Gastrointestinal ROS: no abdominal pain, change in bowel habits, or black or bloody stools  Genito-Urinary ROS: no dysuria, trouble voiding, or hematuria    PE - see vitals  General - sitting on exam table, AAOx3  Abd - soft, nttp, gravid  Ext - no CT bilaterally     A/P: 22 yo  @ 38w4d presents for KALEB, scheduled for repeat section on  @ 710.  1. Repeat low transverse  section - discussed options with patient including possibility of TOLAC, patient declined, would like to proceed with RTLCS. Discussed the benefits and risk. Discussed the risk of infection, pain, scarring, and potential damage to surrounding organs including but not limited to bladder, bowel, ureters, nerves, arteries, and veins. Discussed the risk of bleeding and potentially need for additional medications to stop/control bleeding. Discussed that if bleeding was unable to be controlled by medications or additional procedures, there is a possibility that a hysterectomy would need to be performed.      Discussed with the patient the risk to any surgery in general, including but not limited to death, risk of anesthesia, for which patient will discuss more with Anesthesia the day of surgery, the potential development of a blood clot, and the potential development of a urinary tract infection or need for prolonged use of an indwelling catheter. Discussed the potential for reoperation and blood transfusion.     Discussed recovery times and what  to expect following surgery.  All questions and concerns were addressed. Consents to be signed upon admission to L&D.  Wipes given.  Knows to be NPO at midnight.     Farzana HARTMAN Frijordan

## 2018-11-20 ENCOUNTER — HOSPITAL ENCOUNTER (OUTPATIENT)
Facility: HOSPITAL | Age: 23
Discharge: HOME | End: 2018-11-20
Attending: OBSTETRICS & GYNECOLOGY | Admitting: OBSTETRICS & GYNECOLOGY

## 2018-11-20 VITALS
HEIGHT: 67 IN | BODY MASS INDEX: 33.74 KG/M2 | TEMPERATURE: 98.1 F | DIASTOLIC BLOOD PRESSURE: 70 MMHG | WEIGHT: 215 LBS | RESPIRATION RATE: 20 BRPM | OXYGEN SATURATION: 98 % | SYSTOLIC BLOOD PRESSURE: 115 MMHG | HEART RATE: 86 BPM

## 2018-11-20 LAB — A1 MICROGLOB PLACENTAL VAG QL: NEGATIVE

## 2018-11-20 PROCEDURE — 84112 EVAL AMNIOTIC FLUID PROTEIN: CPT | Performed by: OBSTETRICS & GYNECOLOGY

## 2018-11-20 PROCEDURE — 59025 FETAL NON-STRESS TEST: CPT

## 2018-11-20 PROCEDURE — 59025 FETAL NON-STRESS TEST: CPT | Performed by: OBSTETRICS & GYNECOLOGY

## 2018-11-20 PROCEDURE — G0463 HOSPITAL OUTPT CLINIC VISIT: HCPCS

## 2018-11-20 NOTE — DISCHARGE INSTR - ACTIVITY
Return if vaginal bleeding, think water has broken, decreased fetal movements, or strong regular contractions. Drink plenty of water and keep all scheduled Dr appointments.

## 2018-11-20 NOTE — NON STRESS TEST
Zita Haas, a  at 38w5d with an ROCHELLE of 2018, by Ultrasound, was seen at [facility] LABOR DELIVERY for a nonstress test.    Chief Complaint   Patient presents with   • Rupture of Membranes     reports ruptured at 0000 clear liquid       Patient Active Problem List   Diagnosis   • Maternal care due to low transverse uterine scar from previous  delivery   • History of  delivery, currently pregnant in third trimester   • Heartburn during pregnancy in third trimester       Start Time: 0200  Stop Time: 0230  Interpretation A  Nonstress Test Interpretation A: Reactive (18 : Edyta Curtis, RN)  Comments A: reviewed with FRANCO Grijalva RN (18 : Edyta Curtis, RN)   Nitrizine test, ferning, and amnio test negative for rupture of membranes.  No contractions.   D/cd home.

## 2018-11-22 ENCOUNTER — ANESTHESIA EVENT (OUTPATIENT)
Dept: LABOR AND DELIVERY | Facility: HOSPITAL | Age: 23
End: 2018-11-22

## 2018-11-22 ENCOUNTER — HOSPITAL ENCOUNTER (INPATIENT)
Facility: HOSPITAL | Age: 23
LOS: 1 days | Discharge: HOME | End: 2018-11-23
Attending: OBSTETRICS & GYNECOLOGY | Admitting: OBSTETRICS & GYNECOLOGY

## 2018-11-22 DIAGNOSIS — Z3A.38 38 WEEKS GESTATION OF PREGNANCY: ICD-10-CM

## 2018-11-22 DIAGNOSIS — O34.211 MATERNAL CARE DUE TO LOW TRANSVERSE UTERINE SCAR FROM PREVIOUS CESAREAN DELIVERY: ICD-10-CM

## 2018-11-22 LAB
ABO GROUP BLD: NORMAL
AMPHET+METHAMPHET UR QL: NEGATIVE
BARBITURATES UR QL SCN: NEGATIVE
BENZODIAZ UR QL SCN: NEGATIVE
BLD GP AB SCN SERPL QL: NEGATIVE
CANNABINOIDS SERPL QL: NEGATIVE
COCAINE UR QL: NEGATIVE
DEPRECATED RDW RBC AUTO: 45.9 FL (ref 36.4–46.3)
ERYTHROCYTE [DISTWIDTH] IN BLOOD BY AUTOMATED COUNT: 14.3 % (ref 11.5–14.5)
HCT VFR BLD AUTO: 32.2 % (ref 35–45)
HGB BLD-MCNC: 11.1 G/DL (ref 12–15.5)
Lab: NORMAL
MCH RBC QN AUTO: 30.2 PG (ref 26.5–34)
MCHC RBC AUTO-ENTMCNC: 34.5 G/DL (ref 31.4–36)
MCV RBC AUTO: 87.7 FL (ref 80–98)
METHADONE UR QL SCN: NEGATIVE
OPIATES UR QL: NEGATIVE
OXYCODONE UR QL SCN: NEGATIVE
PLATELET # BLD AUTO: 161 10*3/MM3 (ref 150–450)
PMV BLD AUTO: 11.8 FL (ref 8–12)
RBC # BLD AUTO: 3.67 10*6/MM3 (ref 3.77–5.16)
RH BLD: POSITIVE
T&S EXPIRATION DATE: NORMAL
WBC NRBC COR # BLD: 11.79 10*3/MM3 (ref 3.2–9.8)

## 2018-11-22 PROCEDURE — 80307 DRUG TEST PRSMV CHEM ANLYZR: CPT | Performed by: OBSTETRICS & GYNECOLOGY

## 2018-11-22 PROCEDURE — 94760 N-INVAS EAR/PLS OXIMETRY 1: CPT

## 2018-11-22 PROCEDURE — 86901 BLOOD TYPING SEROLOGIC RH(D): CPT | Performed by: OBSTETRICS & GYNECOLOGY

## 2018-11-22 PROCEDURE — 25010000002 PHENYLEPHRINE PER 1 ML: Performed by: NURSE ANESTHETIST, CERTIFIED REGISTERED

## 2018-11-22 PROCEDURE — 25010000002 KETOROLAC TROMETHAMINE PER 15 MG: Performed by: NURSE ANESTHETIST, CERTIFIED REGISTERED

## 2018-11-22 PROCEDURE — 59515 CESAREAN DELIVERY: CPT | Performed by: OBSTETRICS & GYNECOLOGY

## 2018-11-22 PROCEDURE — 25010000002 MORPHINE PER 10 MG: Performed by: NURSE ANESTHETIST, CERTIFIED REGISTERED

## 2018-11-22 PROCEDURE — 25010000002 ONDANSETRON PER 1 MG: Performed by: NURSE ANESTHETIST, CERTIFIED REGISTERED

## 2018-11-22 PROCEDURE — 85027 COMPLETE CBC AUTOMATED: CPT | Performed by: OBSTETRICS & GYNECOLOGY

## 2018-11-22 PROCEDURE — 86850 RBC ANTIBODY SCREEN: CPT | Performed by: OBSTETRICS & GYNECOLOGY

## 2018-11-22 PROCEDURE — 94799 UNLISTED PULMONARY SVC/PX: CPT

## 2018-11-22 PROCEDURE — 51703 INSERT BLADDER CATH COMPLEX: CPT

## 2018-11-22 PROCEDURE — 86900 BLOOD TYPING SEROLOGIC ABO: CPT | Performed by: OBSTETRICS & GYNECOLOGY

## 2018-11-22 RX ORDER — OXYTOCIN/RINGER'S LACTATE 20/1000 ML
125 PLASTIC BAG, INJECTION (ML) INTRAVENOUS AS NEEDED
Status: DISCONTINUED | OUTPATIENT
Start: 2018-11-22 | End: 2018-11-22 | Stop reason: HOSPADM

## 2018-11-22 RX ORDER — IBUPROFEN 600 MG/1
600 TABLET ORAL EVERY 6 HOURS PRN
Status: DISCONTINUED | OUTPATIENT
Start: 2018-11-22 | End: 2018-11-22

## 2018-11-22 RX ORDER — ONDANSETRON 2 MG/ML
4 INJECTION INTRAMUSCULAR; INTRAVENOUS ONCE AS NEEDED
Status: ACTIVE | OUTPATIENT
Start: 2018-11-22 | End: 2018-11-23

## 2018-11-22 RX ORDER — SODIUM CHLORIDE 0.9 % (FLUSH) 0.9 %
3-10 SYRINGE (ML) INJECTION AS NEEDED
Status: DISCONTINUED | OUTPATIENT
Start: 2018-11-22 | End: 2018-11-23 | Stop reason: HOSPADM

## 2018-11-22 RX ORDER — ONDANSETRON 4 MG/1
4 TABLET, FILM COATED ORAL EVERY 8 HOURS PRN
Status: DISCONTINUED | OUTPATIENT
Start: 2018-11-22 | End: 2018-11-23 | Stop reason: HOSPADM

## 2018-11-22 RX ORDER — SODIUM CHLORIDE 0.9 % (FLUSH) 0.9 %
3 SYRINGE (ML) INJECTION EVERY 12 HOURS SCHEDULED
Status: DISCONTINUED | OUTPATIENT
Start: 2018-11-22 | End: 2018-11-22 | Stop reason: HOSPADM

## 2018-11-22 RX ORDER — OXYTOCIN/RINGER'S LACTATE 20/1000 ML
PLASTIC BAG, INJECTION (ML) INTRAVENOUS
Status: COMPLETED
Start: 2018-11-22 | End: 2018-11-22

## 2018-11-22 RX ORDER — OXYCODONE HYDROCHLORIDE AND ACETAMINOPHEN 5; 325 MG/1; MG/1
1 TABLET ORAL EVERY 4 HOURS PRN
Status: DISCONTINUED | OUTPATIENT
Start: 2018-11-22 | End: 2018-11-23 | Stop reason: HOSPADM

## 2018-11-22 RX ORDER — DOCUSATE SODIUM 100 MG/1
100 CAPSULE, LIQUID FILLED ORAL 2 TIMES DAILY PRN
Status: DISCONTINUED | OUTPATIENT
Start: 2018-11-22 | End: 2018-11-23 | Stop reason: HOSPADM

## 2018-11-22 RX ORDER — SODIUM CHLORIDE 0.9 % (FLUSH) 0.9 %
3-10 SYRINGE (ML) INJECTION AS NEEDED
Status: DISCONTINUED | OUTPATIENT
Start: 2018-11-22 | End: 2018-11-22 | Stop reason: HOSPADM

## 2018-11-22 RX ORDER — BUPIVACAINE HCL/0.9 % NACL/PF 0.1 %
2 PLASTIC BAG, INJECTION (ML) EPIDURAL ONCE
Status: COMPLETED | OUTPATIENT
Start: 2018-11-22 | End: 2018-11-22

## 2018-11-22 RX ORDER — PROMETHAZINE HYDROCHLORIDE 12.5 MG/1
12.5 SUPPOSITORY RECTAL EVERY 6 HOURS PRN
Status: DISCONTINUED | OUTPATIENT
Start: 2018-11-22 | End: 2018-11-22

## 2018-11-22 RX ORDER — BUPIVACAINE HYDROCHLORIDE 7.5 MG/ML
INJECTION, SOLUTION EPIDURAL; RETROBULBAR AS NEEDED
Status: DISCONTINUED | OUTPATIENT
Start: 2018-11-22 | End: 2018-11-22 | Stop reason: SURG

## 2018-11-22 RX ORDER — PROMETHAZINE HYDROCHLORIDE 25 MG/1
25 TABLET ORAL EVERY 6 HOURS PRN
Status: DISCONTINUED | OUTPATIENT
Start: 2018-11-22 | End: 2018-11-23 | Stop reason: HOSPADM

## 2018-11-22 RX ORDER — OXYTOCIN/RINGER'S LACTATE 20/1000 ML
PLASTIC BAG, INJECTION (ML) INTRAVENOUS
Status: DISPENSED
Start: 2018-11-22 | End: 2018-11-22

## 2018-11-22 RX ORDER — DIPHENHYDRAMINE HCL 25 MG
25 CAPSULE ORAL NIGHTLY PRN
Status: DISCONTINUED | OUTPATIENT
Start: 2018-11-22 | End: 2018-11-22 | Stop reason: HOSPADM

## 2018-11-22 RX ORDER — OXYCODONE AND ACETAMINOPHEN 10; 325 MG/1; MG/1
1 TABLET ORAL EVERY 4 HOURS PRN
Status: DISCONTINUED | OUTPATIENT
Start: 2018-11-22 | End: 2018-11-22 | Stop reason: HOSPADM

## 2018-11-22 RX ORDER — OXYTOCIN/RINGER'S LACTATE 20/1000 ML
999 PLASTIC BAG, INJECTION (ML) INTRAVENOUS ONCE
Status: DISCONTINUED | OUTPATIENT
Start: 2018-11-22 | End: 2018-11-22 | Stop reason: HOSPADM

## 2018-11-22 RX ORDER — ONDANSETRON 2 MG/ML
4 INJECTION INTRAMUSCULAR; INTRAVENOUS EVERY 6 HOURS PRN
Status: DISCONTINUED | OUTPATIENT
Start: 2018-11-22 | End: 2018-11-23 | Stop reason: HOSPADM

## 2018-11-22 RX ORDER — NALOXONE HCL 0.4 MG/ML
0.2 VIAL (ML) INJECTION
Status: DISCONTINUED | OUTPATIENT
Start: 2018-11-22 | End: 2018-11-23 | Stop reason: HOSPADM

## 2018-11-22 RX ORDER — ONDANSETRON 4 MG/1
4 TABLET, ORALLY DISINTEGRATING ORAL EVERY 6 HOURS PRN
Status: DISCONTINUED | OUTPATIENT
Start: 2018-11-22 | End: 2018-11-22 | Stop reason: HOSPADM

## 2018-11-22 RX ORDER — ONDANSETRON 2 MG/ML
4 INJECTION INTRAMUSCULAR; INTRAVENOUS EVERY 6 HOURS PRN
Status: DISCONTINUED | OUTPATIENT
Start: 2018-11-22 | End: 2018-11-22

## 2018-11-22 RX ORDER — SODIUM CHLORIDE 0.9 % (FLUSH) 0.9 %
3 SYRINGE (ML) INJECTION EVERY 12 HOURS SCHEDULED
Status: DISCONTINUED | OUTPATIENT
Start: 2018-11-22 | End: 2018-11-23 | Stop reason: HOSPADM

## 2018-11-22 RX ORDER — OXYCODONE AND ACETAMINOPHEN 10; 325 MG/1; MG/1
1 TABLET ORAL EVERY 4 HOURS PRN
Status: DISCONTINUED | OUTPATIENT
Start: 2018-11-22 | End: 2018-11-23 | Stop reason: HOSPADM

## 2018-11-22 RX ORDER — PROMETHAZINE HYDROCHLORIDE 25 MG/ML
12.5 INJECTION, SOLUTION INTRAMUSCULAR; INTRAVENOUS EVERY 6 HOURS PRN
Status: DISCONTINUED | OUTPATIENT
Start: 2018-11-22 | End: 2018-11-23 | Stop reason: HOSPADM

## 2018-11-22 RX ORDER — DIPHENHYDRAMINE HYDROCHLORIDE 50 MG/ML
25 INJECTION INTRAMUSCULAR; INTRAVENOUS EVERY 4 HOURS PRN
Status: DISCONTINUED | OUTPATIENT
Start: 2018-11-22 | End: 2018-11-23 | Stop reason: HOSPADM

## 2018-11-22 RX ORDER — PROMETHAZINE HYDROCHLORIDE 12.5 MG/1
12.5 SUPPOSITORY RECTAL EVERY 6 HOURS PRN
Status: DISCONTINUED | OUTPATIENT
Start: 2018-11-22 | End: 2018-11-23 | Stop reason: HOSPADM

## 2018-11-22 RX ORDER — LANOLIN 100 %
OINTMENT (GRAM) TOPICAL
Status: DISCONTINUED | OUTPATIENT
Start: 2018-11-22 | End: 2018-11-23 | Stop reason: HOSPADM

## 2018-11-22 RX ORDER — PROMETHAZINE HYDROCHLORIDE 25 MG/ML
12.5 INJECTION, SOLUTION INTRAMUSCULAR; INTRAVENOUS EVERY 6 HOURS PRN
Status: DISCONTINUED | OUTPATIENT
Start: 2018-11-22 | End: 2018-11-22

## 2018-11-22 RX ORDER — LIDOCAINE HYDROCHLORIDE 10 MG/ML
5 INJECTION, SOLUTION EPIDURAL; INFILTRATION; INTRACAUDAL; PERINEURAL AS NEEDED
Status: DISCONTINUED | OUTPATIENT
Start: 2018-11-22 | End: 2018-11-22 | Stop reason: HOSPADM

## 2018-11-22 RX ORDER — TRISODIUM CITRATE DIHYDRATE AND CITRIC ACID MONOHYDRATE 500; 334 MG/5ML; MG/5ML
30 SOLUTION ORAL ONCE
Status: COMPLETED | OUTPATIENT
Start: 2018-11-22 | End: 2018-11-22

## 2018-11-22 RX ORDER — IBUPROFEN 600 MG/1
600 TABLET ORAL EVERY 8 HOURS PRN
Status: DISCONTINUED | OUTPATIENT
Start: 2018-11-22 | End: 2018-11-23 | Stop reason: HOSPADM

## 2018-11-22 RX ORDER — PRENATAL VIT/IRON FUM/FOLIC AC 27MG-0.8MG
1 TABLET ORAL DAILY
Status: DISCONTINUED | OUTPATIENT
Start: 2018-11-22 | End: 2018-11-23 | Stop reason: HOSPADM

## 2018-11-22 RX ORDER — SODIUM CHLORIDE, SODIUM LACTATE, POTASSIUM CHLORIDE, CALCIUM CHLORIDE 600; 310; 30; 20 MG/100ML; MG/100ML; MG/100ML; MG/100ML
INJECTION, SOLUTION INTRAVENOUS
Status: COMPLETED
Start: 2018-11-22 | End: 2018-11-22

## 2018-11-22 RX ORDER — ACETAMINOPHEN 325 MG/1
650 TABLET ORAL EVERY 4 HOURS PRN
Status: DISCONTINUED | OUTPATIENT
Start: 2018-11-22 | End: 2018-11-22 | Stop reason: HOSPADM

## 2018-11-22 RX ORDER — PROMETHAZINE HYDROCHLORIDE 25 MG/ML
12.5 INJECTION, SOLUTION INTRAMUSCULAR; INTRAVENOUS EVERY 6 HOURS PRN
Status: DISCONTINUED | OUTPATIENT
Start: 2018-11-22 | End: 2018-11-22 | Stop reason: HOSPADM

## 2018-11-22 RX ORDER — OXYTOCIN/RINGER'S LACTATE 20/1000 ML
1000 PLASTIC BAG, INJECTION (ML) INTRAVENOUS CONTINUOUS
Status: ACTIVE | OUTPATIENT
Start: 2018-11-22 | End: 2018-11-22

## 2018-11-22 RX ORDER — ONDANSETRON 2 MG/ML
INJECTION INTRAMUSCULAR; INTRAVENOUS AS NEEDED
Status: DISCONTINUED | OUTPATIENT
Start: 2018-11-22 | End: 2018-11-22 | Stop reason: SURG

## 2018-11-22 RX ORDER — HYDROXYZINE HYDROCHLORIDE 25 MG/1
50 TABLET, FILM COATED ORAL EVERY 6 HOURS PRN
Status: DISCONTINUED | OUTPATIENT
Start: 2018-11-22 | End: 2018-11-23 | Stop reason: HOSPADM

## 2018-11-22 RX ORDER — DIPHENHYDRAMINE HCL 25 MG
25 CAPSULE ORAL EVERY 4 HOURS PRN
Status: DISCONTINUED | OUTPATIENT
Start: 2018-11-22 | End: 2018-11-23 | Stop reason: HOSPADM

## 2018-11-22 RX ORDER — MORPHINE SULFATE 1 MG/ML
INJECTION, SOLUTION EPIDURAL; INTRATHECAL; INTRAVENOUS AS NEEDED
Status: DISCONTINUED | OUTPATIENT
Start: 2018-11-22 | End: 2018-11-22 | Stop reason: SURG

## 2018-11-22 RX ORDER — DIPHENHYDRAMINE HYDROCHLORIDE 50 MG/ML
25 INJECTION INTRAMUSCULAR; INTRAVENOUS NIGHTLY PRN
Status: DISCONTINUED | OUTPATIENT
Start: 2018-11-22 | End: 2018-11-22

## 2018-11-22 RX ORDER — KETOROLAC TROMETHAMINE 30 MG/ML
INJECTION, SOLUTION INTRAMUSCULAR; INTRAVENOUS AS NEEDED
Status: DISCONTINUED | OUTPATIENT
Start: 2018-11-22 | End: 2018-11-22 | Stop reason: SURG

## 2018-11-22 RX ORDER — OXYTOCIN/RINGER'S LACTATE 20/1000 ML
PLASTIC BAG, INJECTION (ML) INTRAVENOUS CONTINUOUS PRN
Status: DISCONTINUED | OUTPATIENT
Start: 2018-11-22 | End: 2018-11-22 | Stop reason: SURG

## 2018-11-22 RX ORDER — ONDANSETRON 4 MG/1
4 TABLET, FILM COATED ORAL EVERY 6 HOURS PRN
Status: DISCONTINUED | OUTPATIENT
Start: 2018-11-22 | End: 2018-11-22

## 2018-11-22 RX ORDER — SODIUM CHLORIDE, SODIUM LACTATE, POTASSIUM CHLORIDE, CALCIUM CHLORIDE 600; 310; 30; 20 MG/100ML; MG/100ML; MG/100ML; MG/100ML
125 INJECTION, SOLUTION INTRAVENOUS CONTINUOUS
Status: DISCONTINUED | OUTPATIENT
Start: 2018-11-22 | End: 2018-11-22

## 2018-11-22 RX ORDER — BISACODYL 10 MG
10 SUPPOSITORY, RECTAL RECTAL DAILY PRN
Status: DISCONTINUED | OUTPATIENT
Start: 2018-11-22 | End: 2018-11-23 | Stop reason: HOSPADM

## 2018-11-22 RX ORDER — PROMETHAZINE HYDROCHLORIDE 12.5 MG/1
12.5 TABLET ORAL EVERY 6 HOURS PRN
Status: DISCONTINUED | OUTPATIENT
Start: 2018-11-22 | End: 2018-11-22 | Stop reason: HOSPADM

## 2018-11-22 RX ADMIN — Medication 2 G: at 08:42

## 2018-11-22 RX ADMIN — OXYCODONE HYDROCHLORIDE AND ACETAMINOPHEN 1 TABLET: 10; 325 TABLET ORAL at 19:39

## 2018-11-22 RX ADMIN — BUPIVACAINE HYDROCHLORIDE 1.8 ML: 7.5 INJECTION, SOLUTION EPIDURAL; RETROBULBAR at 08:35

## 2018-11-22 RX ADMIN — ONDANSETRON 4 MG: 2 INJECTION INTRAMUSCULAR; INTRAVENOUS at 08:57

## 2018-11-22 RX ADMIN — PHENYLEPHRINE HYDROCHLORIDE 100 MCG: 10 INJECTION INTRAVENOUS at 08:46

## 2018-11-22 RX ADMIN — KETOROLAC TROMETHAMINE 30 MG: 30 INJECTION, SOLUTION INTRAMUSCULAR; INTRAVENOUS at 09:14

## 2018-11-22 RX ADMIN — SODIUM CHLORIDE, POTASSIUM CHLORIDE, SODIUM LACTATE AND CALCIUM CHLORIDE 1000 ML: 600; 310; 30; 20 INJECTION, SOLUTION INTRAVENOUS at 07:30

## 2018-11-22 RX ADMIN — SODIUM CHLORIDE, POTASSIUM CHLORIDE, SODIUM LACTATE AND CALCIUM CHLORIDE: 600; 310; 30; 20 INJECTION, SOLUTION INTRAVENOUS at 08:24

## 2018-11-22 RX ADMIN — SODIUM CITRATE AND CITRIC ACID MONOHYDRATE 30 ML: 500; 334 SOLUTION ORAL at 07:30

## 2018-11-22 RX ADMIN — Medication 0.2 MG: at 08:35

## 2018-11-22 RX ADMIN — Medication 999 ML/HR: at 08:53

## 2018-11-22 NOTE — PLAN OF CARE
Problem: Patient Care Overview  Goal: Plan of Care Review  Outcome: Ongoing (interventions implemented as appropriate)   18 [id number]   Coping/Psychosocial   Plan of Care Reviewed With patient   Plan of Care Review   Progress improving   OTHER   Outcome Summary VSS; stood at bedside; pain well controlled with duramorph     Goal: Individualization and Mutuality  Outcome: Ongoing (interventions implemented as appropriate)    Goal: Discharge Needs Assessment  Outcome: Ongoing (interventions implemented as appropriate)    Goal: Interprofessional Rounds/Family Conf  Outcome: Ongoing (interventions implemented as appropriate)      Problem: Postpartum ( Delivery) (Adult,Obstetrics,Pediatric)  Goal: Signs and Symptoms of Listed Potential Problems Will be Absent, Minimized or Managed (Postpartum)  Outcome: Ongoing (interventions implemented as appropriate)    Goal: Anesthesia/Sedation Recovery  Outcome: Ongoing (interventions implemented as appropriate)      Problem: Fall Risk (Adult)  Goal: Identify Related Risk Factors and Signs and Symptoms  Outcome: Ongoing (interventions implemented as appropriate)    Goal: Absence of Fall  Outcome: Ongoing (interventions implemented as appropriate)

## 2018-11-22 NOTE — ANESTHESIA PROCEDURE NOTES
Spinal Block      Patient reassessed immediately prior to procedure    Patient location during procedure: OB  Indication:at surgeon's request  Performed By  CRNA: Ulysses Luo CRNA  Preanesthetic Checklist  Completed: patient identified, site marked, surgical consent, pre-op evaluation, timeout performed, IV checked, risks and benefits discussed and monitors and equipment checked  Spinal Block Prep:  Patient Position:sitting  Sterile Tech:cap, gloves, mask and sterile barriers  Prep:Betadine  Patient Monitoring:continuous pulse oximetry, blood pressure monitoring and EKG  Spinal Block Procedure  Approach:midline  Guidance:landmark technique  Location:L4-L5  Needle Type:Memo  Needle Gauge:25 G  Placement of Spinal needle event:cerebrospinal fluid aspirated  Paresthesia: no  Fluid Appearance:clear     Post Assessment  Patient Tolerance:patient tolerated the procedure well with no apparent complications  Complications no

## 2018-11-22 NOTE — ANESTHESIA POSTPROCEDURE EVALUATION
Patient: Zita Swanson Samina    Procedure Summary     Date:  18 Room / Location:  [facility] LABOR DELIVERY  [facility] LABOR DELIVERY    Anesthesia Start:  824 Anesthesia Stop:  934    Procedure:   SECTION REPEAT (Bilateral Abdomen) Diagnosis:      Surgeon:  FridayFarzana MD Provider:  Ulysses Luo CRNA    Anesthesia Type:  spinal ASA Status:  3          Anesthesia Type: spinal  Last vitals  BP   97/53 (18)   Temp   97.5 °F (36.4 °C) (18)   Pulse   77 (18)   Resp   18 (18)     SpO2   100 % (18)     Post Anesthesia Care and Evaluation    Patient location during evaluation: bedside  Patient participation: complete - patient participated  Level of consciousness: awake and alert  Pain score: 0  Pain management: adequate  Airway patency: patent  Anesthetic complications: No anesthetic complications  PONV Status: none  Cardiovascular status: acceptable  Respiratory status: acceptable  Hydration status: acceptable  Post Neuraxial Block status: No signs or symptoms of PDPH

## 2018-11-22 NOTE — OP NOTE
SECTION DICTATION    Preoperative Diagnosis:   1. IUP 39w0d  2. Previous  section, desires repeat    Postoperative Diagnosis:   1. Same, delivered via RLTCS    Procedure: Repeat Low Transverse  Section  Surgeon: Farzana Montiel MD  Assist: Felicitas Graham  Anesthesia: Spinal  EBL: 1000 mL  Fluids: 1700 mL    Findings: Live female infant in vertex presentation. Clear amniotic fluid. Weight of 7#1oz, Apgars 9/10 . Normal uterus, tubes, ovaries bilaterally.    Indications: This is a 22 yo  @ 39w0d with a history of a previous  section that desires a repeat  for delivery.   She came in reporting contractions and given that she was 39 weeks, we decided to proceed with delivery.    A thorough consultation regarding the complications, risks, indications, benefits, failures, and alternatives were discussed with the patient including but not limited to bleeding, infection and injury to adjacent organs. The patient desired to proceed with the surgery. All questions and concerns were answered and addressed before proceeding to the operating room.    Description of Procedure:   After assuring informed consent, the patient was taken to the operating room and spinal anesthesia was initiated. The patient was placed in the dorsal, supine position with left lateral tilt. The abdomen was prepped and draped in sterile fashion. Time out was performed. Anesthesia was found to be adequate.     A Pfannenstiel skin incision was made with a scalpel and carried through to the level of the fascia. The fascial incision was extended bilaterally with Micntosh scissors. The rectus muscles were then dissected both sharply and bluntly superiorly from the fascia while tenting the fascia up with the Kocher clamps.    The rectus muscles were then  in the midline, and the peritoneum was then entered bluntly. The peritoneal incision was extended superiorly and inferiorly with good visualization of the  bladder.    A bladder blade was then inserted, and with the bladder well out of they way of the anticipate hysterotomy site the lower uterine segment was incised in a transverse fashion with the scalpel and then extended bilaterally.    The bladder blade was removed, and the infant's head was delivered atraumatically. The nose and mouth were suctioned and the cord was clamped and cut. The infant was handed off to the awaiting nursing staff. Cord segment and blood were collected.     The placenta was then removed manually. It was still delivered intact and a 3 vessel cord was identified. The uterus was exteriorized.  The uterus was cleared of all clots. The uterine incision was repaired in a single layer with 0 Vicryl suture in a standard running locking fashion with excellent hemostasis achieved. A second layer of 0 Vicryl was placed as an imbricating layer. Hemostasis was noted. Normal uterus and b/l tube and ovaries noted. Pelvic gutters evacuated of all debris.  The uterus was returned to abdomen, and attention was turned back to the hysterotomy site, hemostasis remained.  Gel foam was placed to ensure hemostasis.     The fascia was re-approximated with 0 Vicryl in a running fashion. The subcutaneous tissue were irrigated and rendered hemostatic with bovie cautery.  The space was closed with running sutures of 2-0 plain. The skin was closed with 3-0 Monocryl and a pressure dressing was applied.     The patient tolerated the procedure well. Needle, lap, and sponge counts were correct times two. Two grams of Kefzol were given prior to the procedure, and a sterile dressing was placed over the incision.    Complications: None.  Specimens: Cord blood          This document has been electronically signed by Farzana Montiel MD on November 22, 2018 9:30 AM

## 2018-11-22 NOTE — ANESTHESIA PREPROCEDURE EVALUATION
Anesthesia Evaluation     Patient summary reviewed and Nursing notes reviewed   NPO Solid Status: > 8 hours  NPO Liquid Status: > 2 hours           Airway   Mallampati: II  TM distance: >3 FB  Neck ROM: full  No difficulty expected  Dental    (+) poor dentition    Pulmonary - normal exam   (+) a smoker Current Smoked day of surgery,   Cardiovascular - negative cardio ROS    Rhythm: regular  Rate: normal        Neuro/Psych  GI/Hepatic/Renal/Endo    (+) obesity,       Musculoskeletal         ROS comment: Lumbar scoliosis per patient statement  Abdominal    Substance History - negative use     OB/GYN          Other - negative ROS                       Anesthesia Plan    ASA 3     spinal     Anesthetic plan, all risks, benefits, and alternatives have been provided, discussed and informed consent has been obtained with: patient.    Plan discussed with CRNA.

## 2018-11-22 NOTE — INTERVAL H&P NOTE
Patient presented with complaints of contractions.  States they started last night and have continued to increase in both frequency and intensity.  No relief after a bath, prompting her to come in.  No LOF or vb.  +FM.      PE - see vitals  SVE - closed    FHT - category 1 strip    Given complaints of uncomfortableness, a previous section x 1, desire for repeat, and 39 weeks gestation.  Will proceed with repeat section this AM.  Reviewed risk and benefits as previously discussed.  Charge nurse, anesthesia, and NICU notified.           This document has been electronically signed by Farzana Montiel MD on November 22, 2018 6:34 AM

## 2018-11-23 VITALS
OXYGEN SATURATION: 98 % | TEMPERATURE: 98.4 F | HEART RATE: 90 BPM | RESPIRATION RATE: 18 BRPM | SYSTOLIC BLOOD PRESSURE: 114 MMHG | DIASTOLIC BLOOD PRESSURE: 55 MMHG

## 2018-11-23 LAB
BASOPHILS # BLD AUTO: 0.02 10*3/MM3 (ref 0–0.2)
BASOPHILS NFR BLD AUTO: 0.1 % (ref 0–2)
DEPRECATED RDW RBC AUTO: 45.2 FL (ref 36.4–46.3)
EOSINOPHIL # BLD AUTO: 0.13 10*3/MM3 (ref 0–0.7)
EOSINOPHIL NFR BLD AUTO: 0.8 % (ref 0–7)
ERYTHROCYTE [DISTWIDTH] IN BLOOD BY AUTOMATED COUNT: 14.3 % (ref 11.5–14.5)
HCT VFR BLD AUTO: 29.7 % (ref 35–45)
HGB BLD-MCNC: 10.3 G/DL (ref 12–15.5)
IMM GRANULOCYTES # BLD: 0.04 10*3/MM3 (ref 0–0.02)
IMM GRANULOCYTES NFR BLD: 0.3 % (ref 0–0.5)
LYMPHOCYTES # BLD AUTO: 1.64 10*3/MM3 (ref 0.6–4.2)
LYMPHOCYTES NFR BLD AUTO: 10.3 % (ref 10–50)
MCH RBC QN AUTO: 30.4 PG (ref 26.5–34)
MCHC RBC AUTO-ENTMCNC: 34.7 G/DL (ref 31.4–36)
MCV RBC AUTO: 87.6 FL (ref 80–98)
MONOCYTES # BLD AUTO: 0.88 10*3/MM3 (ref 0–0.9)
MONOCYTES NFR BLD AUTO: 5.5 % (ref 0–12)
NEUTROPHILS # BLD AUTO: 13.28 10*3/MM3 (ref 2–8.6)
NEUTROPHILS NFR BLD AUTO: 83 % (ref 37–80)
PLATELET # BLD AUTO: 147 10*3/MM3 (ref 150–450)
PMV BLD AUTO: 11.9 FL (ref 8–12)
RBC # BLD AUTO: 3.39 10*6/MM3 (ref 3.77–5.16)
WBC NRBC COR # BLD: 15.99 10*3/MM3 (ref 3.2–9.8)

## 2018-11-23 PROCEDURE — 94799 UNLISTED PULMONARY SVC/PX: CPT

## 2018-11-23 PROCEDURE — 85025 COMPLETE CBC W/AUTO DIFF WBC: CPT | Performed by: OBSTETRICS & GYNECOLOGY

## 2018-11-23 RX ORDER — MEDROXYPROGESTERONE ACETATE 150 MG/ML
150 INJECTION, SUSPENSION INTRAMUSCULAR ONCE
Status: COMPLETED | OUTPATIENT
Start: 2018-11-23 | End: 2018-11-23

## 2018-11-23 RX ORDER — OXYCODONE HYDROCHLORIDE AND ACETAMINOPHEN 5; 325 MG/1; MG/1
TABLET ORAL
Qty: 45 TABLET | Refills: 0 | Status: ON HOLD | OUTPATIENT
Start: 2018-11-23 | End: 2021-03-16

## 2018-11-23 RX ORDER — IBUPROFEN 600 MG/1
600 TABLET ORAL EVERY 6 HOURS PRN
Qty: 60 TABLET | Refills: 0 | Status: ON HOLD | OUTPATIENT
Start: 2018-11-23 | End: 2021-03-16

## 2018-11-23 RX ORDER — PSEUDOEPHEDRINE HCL 30 MG
100 TABLET ORAL 2 TIMES DAILY PRN
Qty: 60 EACH | Refills: 0 | Status: ON HOLD | OUTPATIENT
Start: 2018-11-23 | End: 2021-03-16

## 2018-11-23 RX ADMIN — OXYCODONE HYDROCHLORIDE AND ACETAMINOPHEN 1 TABLET: 10; 325 TABLET ORAL at 01:39

## 2018-11-23 RX ADMIN — MEDROXYPROGESTERONE ACETATE 150 MG: 150 INJECTION, SUSPENSION INTRAMUSCULAR at 08:44

## 2018-11-23 RX ADMIN — IBUPROFEN 600 MG: 600 TABLET ORAL at 08:04

## 2018-11-23 RX ADMIN — OXYCODONE HYDROCHLORIDE AND ACETAMINOPHEN 1 TABLET: 10; 325 TABLET ORAL at 08:04

## 2018-11-23 NOTE — PLAN OF CARE
Problem: Patient Care Overview  Goal: Plan of Care Review  Outcome: Ongoing (interventions implemented as appropriate)   18 [id number]   Coping/Psychosocial   Plan of Care Reviewed With patient   Plan of Care Review   Progress improving   OTHER   Outcome Summary Bottlefeeding.      Goal: Individualization and Mutuality  Outcome: Ongoing (interventions implemented as appropriate)    Goal: Discharge Needs Assessment  Outcome: Ongoing (interventions implemented as appropriate)    Goal: Interprofessional Rounds/Family Conf  Outcome: Ongoing (interventions implemented as appropriate)      Problem: Postpartum ( Delivery) (Adult,Obstetrics,Pediatric)  Goal: Signs and Symptoms of Listed Potential Problems Will be Absent, Minimized or Managed (Postpartum)  Outcome: Ongoing (interventions implemented as appropriate)    Goal: Anesthesia/Sedation Recovery  Outcome: Ongoing (interventions implemented as appropriate)      Problem: Fall Risk (Adult)  Goal: Identify Related Risk Factors and Signs and Symptoms  Outcome: Ongoing (interventions implemented as appropriate)    Goal: Absence of Fall  Outcome: Ongoing (interventions implemented as appropriate)

## 2018-11-23 NOTE — PAYOR COMM NOTE
"Karson Haas (23 y.o. Female)     Date of Birth Social Security Number Address Home Phone MRN    1995  [address] [place] [id number] [phone] [id number]    Jehovah's witness Marital Status          Zoroastrianism Significant Other       Admission Date Admission Type Admitting Provider Attending Provider Department, Room/Bed    11/22/18 Elective Friday, Farzana LEE MD Friday, Farzana LEE MD [facility] MOTHER BABY, M755/1    Discharge Date Discharge Disposition Discharge Destination         Home or Self Care              Attending Provider:  FridayFarzana MD    Allergies:  Nicotine Polacrilex    Isolation:  None   Infection:  None   Code Status:  CPR    Ht:  170.2 cm (67\")   Wt:  97.5 kg (215 lb)    Admission Cmt:  None   Principal Problem:  None                Active Insurance as of 11/22/2018     Primary Coverage     Payor Plan Insurance Group Employer/Plan Group    HU[employer] MEDICAID [employer] [id number]     Payor Plan Address Payor Plan Phone Number Payor Plan Fax Number Effective Dates    PO  [phone]  11/12/2017 - None Entered    [facility] [id number]       Subscriber Name Subscriber Birth Date Member ID       KARSON HAAS 1995 [id number]                 Emergency Contacts      (Rel.) Home Phone Work Phone Mobile Phone    Martha Mcdermott (Sister) [phone] -- [phone]    Jo Dhillon (Friend) [phone] -- [phone]        [facility]  P:[phone]  F:[phone]         History & Physical      Friday, Farzana LEE MD at 11/22/2018  6:32 AM        Patient presented with complaints of contractions.  States they started last night and have continued to increase in both frequency and intensity.  No relief after a bath, prompting her to come in.  No LOF or vb.  +FM.      PE - see vitals  SVE - closed    FHT - category 1 strip    Given complaints of uncomfortableness, a previous section x 1, desire for repeat, " and 39 weeks gestation.  Will proceed with repeat section this AM.  Reviewed risk and benefits as previously discussed.  Charge nurse, anesthesia, and NICU notified.           This document has been electronically signed by Farzana Montiel MD on 2018 6:34 AM        Electronically signed by Farzana Montiel MD at 2018  6:34 AM   Source Note             KALEB visit    HPI:  Has had some lower back pain.  No real contractions.  +FM.  No LOF or vb.   States she still wants to have a repeat section.      OB History      Para Term  AB Living    3 1   1 1 1    SAB TAB Ectopic Molar Multiple Live Births              1        Past Medical History:   Diagnosis Date   • Anxiety    • Bipolar disorder (CMS/HCC)    • Depression    • Migraine    • Post traumatic stress disorder (PTSD)    • Seizures (CMS/HCC)     remote h/o drug induced seizure   • Tobacco dependence syndrome      Past Surgical History:   Procedure Laterality Date   •  SECTION     • WISDOM TOOTH EXTRACTION       Social History     Socioeconomic History   • Marital status: Other     Spouse name: Not on file   • Number of children: Not on file   • Years of education: Not on file   • Highest education level: Not on file   Social Needs   • Financial resource strain: Not on file   • Food insecurity - worry: Not on file   • Food insecurity - inability: Not on file   • Transportation needs - medical: Not on file   • Transportation needs - non-medical: Not on file   Occupational History   • Not on file   Tobacco Use   • Smoking status: Current Every Day Smoker     Packs/day: 1.00     Types: Cigarettes   • Smokeless tobacco: Never Used   Substance and Sexual Activity   • Alcohol use: No   • Drug use: No   • Sexual activity: Yes     Partners: Male     Birth control/protection: None   Other Topics Concern   • Not on file   Social History Narrative   • Not on file     Family History   Problem Relation Age of Onset   • Endometriosis  Mother    • Miscarriages / Stillbirths Mother         2   • Other Mother           mother had guillian barre   • Depression Sister    • Anxiety disorder Sister    • Ulcers Maternal Grandmother    • Heart defect Maternal Grandfather      Current Outpatient Medications on File Prior to Visit   Medication Sig   • nitrofurantoin, macrocrystal-monohydrate, (MACROBID) 100 MG capsule Take 1 capsule by mouth 2 (Two) Times a Day for 10 days.   • PRENATAL GUMMY VITAMIN Chew 1 each Daily.   • raNITIdine (ZANTAC) 150 MG tablet Take 1 tablet by mouth 2 (Two) Times a Day.     No current facility-administered medications on file prior to visit.      Allergies   Allergen Reactions   • Nicotine Polacrilex Rash     patch     Review of Systems - General ROS: negative  Respiratory ROS: no cough, shortness of breath, or wheezing  Cardiovascular ROS: no chest pain or dyspnea on exertion  Gastrointestinal ROS: no abdominal pain, change in bowel habits, or black or bloody stools  Genito-Urinary ROS: no dysuria, trouble voiding, or hematuria    PE - see vitals  General - sitting on exam table, AAOx3  Abd - soft, nttp, gravid  Ext - no CT bilaterally     A/P: 22 yo  @ 38w4d presents for KALEB, scheduled for repeat section on  @ 710.  1. Repeat low transverse  section - discussed options with patient including possibility of TOLAC, patient declined, would like to proceed with RTLCS. Discussed the benefits and risk. Discussed the risk of infection, pain, scarring, and potential damage to surrounding organs including but not limited to bladder, bowel, ureters, nerves, arteries, and veins. Discussed the risk of bleeding and potentially need for additional medications to stop/control bleeding. Discussed that if bleeding was unable to be controlled by medications or additional procedures, there is a possibility that a hysterectomy would need to be performed.      Discussed with the patient the risk to any surgery in general,  including but not limited to death, risk of anesthesia, for which patient will discuss more with Anesthesia the day of surgery, the potential development of a blood clot, and the potential development of a urinary tract infection or need for prolonged use of an indwelling catheter. Discussed the potential for reoperation and blood transfusion.     Discussed recovery times and what to expect following surgery.  All questions and concerns were addressed. Consents to be signed upon admission to L&D.  Wipes given.  Knows to be NPO at midnight.     Farzana HARTMAN Friday        Electronically signed by FridayFarzana MD at 2018  2:40 PM             FridayFarzana MD at 2018  2:00 PM          KALEB visit    HPI:  Has had some lower back pain.  No real contractions.  +FM.  No LOF or vb.   States she still wants to have a repeat section.      OB History      Para Term  AB Living    3 1   1 1 1    SAB TAB Ectopic Molar Multiple Live Births              1        Past Medical History:   Diagnosis Date   • Anxiety    • Bipolar disorder (CMS/HCC)    • Depression    • Migraine    • Post traumatic stress disorder (PTSD)    • Seizures (CMS/HCC)     remote h/o drug induced seizure   • Tobacco dependence syndrome      Past Surgical History:   Procedure Laterality Date   •  SECTION     • WISDOM TOOTH EXTRACTION       Social History     Socioeconomic History   • Marital status: Other     Spouse name: Not on file   • Number of children: Not on file   • Years of education: Not on file   • Highest education level: Not on file   Social Needs   • Financial resource strain: Not on file   • Food insecurity - worry: Not on file   • Food insecurity - inability: Not on file   • Transportation needs - medical: Not on file   • Transportation needs - non-medical: Not on file   Occupational History   • Not on file   Tobacco Use   • Smoking status: Current Every Day Smoker     Packs/day: 1.00     Types: Cigarettes   •  Smokeless tobacco: Never Used   Substance and Sexual Activity   • Alcohol use: No   • Drug use: No   • Sexual activity: Yes     Partners: Male     Birth control/protection: None   Other Topics Concern   • Not on file   Social History Narrative   • Not on file     Family History   Problem Relation Age of Onset   • Endometriosis Mother    • Miscarriages / Stillbirths Mother         2   • Other Mother           mother had guillian barre   • Depression Sister    • Anxiety disorder Sister    • Ulcers Maternal Grandmother    • Heart defect Maternal Grandfather      Current Outpatient Medications on File Prior to Visit   Medication Sig   • nitrofurantoin, macrocrystal-monohydrate, (MACROBID) 100 MG capsule Take 1 capsule by mouth 2 (Two) Times a Day for 10 days.   • PRENATAL GUMMY VITAMIN Chew 1 each Daily.   • raNITIdine (ZANTAC) 150 MG tablet Take 1 tablet by mouth 2 (Two) Times a Day.     No current facility-administered medications on file prior to visit.      Allergies   Allergen Reactions   • Nicotine Polacrilex Rash     patch     Review of Systems - General ROS: negative  Respiratory ROS: no cough, shortness of breath, or wheezing  Cardiovascular ROS: no chest pain or dyspnea on exertion  Gastrointestinal ROS: no abdominal pain, change in bowel habits, or black or bloody stools  Genito-Urinary ROS: no dysuria, trouble voiding, or hematuria    PE - see vitals  General - sitting on exam table, AAOx3  Abd - soft, nttp, gravid  Ext - no CT bilaterally     A/P: 22 yo  @ 38w4d presents for KALEB, scheduled for repeat section on  @ 710.  1. Repeat low transverse  section - discussed options with patient including possibility of TOLAC, patient declined, would like to proceed with RTLCS. Discussed the benefits and risk. Discussed the risk of infection, pain, scarring, and potential damage to surrounding organs including but not limited to bladder, bowel, ureters, nerves, arteries, and veins. Discussed the  "risk of bleeding and potentially need for additional medications to stop/control bleeding. Discussed that if bleeding was unable to be controlled by medications or additional procedures, there is a possibility that a hysterectomy would need to be performed.      Discussed with the patient the risk to any surgery in general, including but not limited to death, risk of anesthesia, for which patient will discuss more with Anesthesia the day of surgery, the potential development of a blood clot, and the potential development of a urinary tract infection or need for prolonged use of an indwelling catheter. Discussed the potential for reoperation and blood transfusion.     Discussed recovery times and what to expect following surgery.  All questions and concerns were addressed. Consents to be signed upon admission to L&D.  Wipes given.  Knows to be NPO at midnight.     Farzana HARTMAN Friday       Electronically signed by FridayFarzana MD at 11/19/2018  2:40 PM       Hospital Medications (all)       Dose Frequency Start End    bisacodyl (DULCOLAX) suppository 10 mg 10 mg Daily PRN 11/22/2018     Sig - Route: Insert 1 suppository into the rectum Daily As Needed for Constipation. - Rectal    ceFAZolin in 0.9% normal saline (ANCEF) IVPB solution 2 g 2 g Once 11/22/2018 11/22/2018    Sig - Route: Infuse 100 mL into a venous catheter 1 (One) Time. - Intravenous    diphenhydrAMINE (BENADRYL) capsule 25 mg 25 mg Every 4 Hours PRN 11/22/2018     Sig - Route: Take 1 capsule by mouth Every 4 (Four) Hours As Needed for Itching. - Oral    Linked Group 1:  \"Or\" Linked Group Details        diphenhydrAMINE (BENADRYL) injection 25 mg 25 mg Every 4 Hours PRN 11/22/2018     Sig - Route: Infuse 0.5 mL into a venous catheter Every 4 (Four) Hours As Needed for Itching. - Intravenous    Linked Group 1:  \"Or\" Linked Group Details        diphenhydrAMINE (BENADRYL) injection 25 mg 25 mg Every 4 Hours PRN 11/22/2018     Sig - Route: Inject 0.5 mL into " "the appropriate muscle as directed by prescriber Every 4 (Four) Hours As Needed for Itching. - Intramuscular    Linked Group 1:  \"Or\" Linked Group Details        docusate sodium (COLACE) capsule 100 mg 100 mg 2 Times Daily PRN 11/22/2018     Sig - Route: Take 1 capsule by mouth 2 (Two) Times a Day As Needed for Constipation. - Oral    hydrOXYzine (ATARAX) tablet 50 mg 50 mg Every 6 Hours PRN 11/22/2018     Sig - Route: Take 2 tablets by mouth Every 6 (Six) Hours As Needed for Itching. - Oral    ibuprofen (ADVIL,MOTRIN) tablet 600 mg 600 mg Every 8 Hours PRN 11/22/2018     Sig - Route: Take 1 tablet by mouth Every 8 (Eight) Hours As Needed for Mild Pain . - Oral    lactated ringers bolus 1,000 mL 1,000 mL Once 11/22/2018 11/22/2018    Sig - Route: Infuse 1,000 mL into a venous catheter 1 (One) Time. - Intravenous    lactated ringers infusion  - ADS Override Pull   11/22/2018 11/22/2018    Notes to Pharmacy: CASPER LANGLEY: cabinet override    lanolin ointment  Every 1 Hour PRN 11/22/2018     Sig - Route: Apply  topically to the appropriate area as directed Every 1 (One) Hour As Needed for Dry Skin (nipple pain). - Topical    medroxyPROGESTERone (DEPO-PROVERA) injection 150 mg 150 mg Once 11/23/2018 11/23/2018    Sig - Route: Inject 1 mL into the appropriate muscle as directed by prescriber 1 (One) Time. - Intramuscular    naloxone (NARCAN) injection 0.2 mg 0.2 mg Every 15 Minutes PRN 11/22/2018     Sig - Route: Infuse 0.5 mL into a venous catheter Every 15 (Fifteen) Minutes As Needed (itching). - Intravenous    ondansetron (ZOFRAN) injection 4 mg 4 mg Once As Needed 11/22/2018 11/23/2018    Sig - Route: Infuse 2 mL into a venous catheter 1 (One) Time As Needed for Nausea or Vomiting. - Intravenous    ondansetron (ZOFRAN) injection 4 mg 4 mg Every 6 Hours PRN 11/22/2018     Sig - Route: Infuse 2 mL into a venous catheter Every 6 (Six) Hours As Needed for Nausea or Vomiting. - Intravenous    ondansetron (ZOFRAN) tablet 4 " "mg 4 mg Every 8 Hours PRN 11/22/2018     Sig - Route: Take 1 tablet by mouth Every 8 (Eight) Hours As Needed for Nausea or Vomiting. - Oral    oxyCODONE-acetaminophen (PERCOCET)  MG per tablet 1 tablet 1 tablet Every 4 Hours PRN 11/22/2018 12/2/2018    Sig - Route: Take 1 tablet by mouth Every 4 (Four) Hours As Needed for Severe Pain . - Oral    oxyCODONE-acetaminophen (PERCOCET) 5-325 MG per tablet 1 tablet 1 tablet Every 4 Hours PRN 11/22/2018 12/2/2018    Sig - Route: Take 1 tablet by mouth Every 4 (Four) Hours As Needed for Moderate Pain . - Oral    Oxytocin-Lactated Ringers (PITOCIN) 20 UNIT/L in lactated Ringer's 1000 mL IVPB  - ADS Override Pull   11/22/2018 11/22/2018    Notes to Pharmacy: MAGDA FERRO: cabinet override    Oxytocin-Lactated Ringers (PITOCIN) 20 UNIT/L in lactated Ringer's 1000 mL IVPB  - ADS Override Pull   11/22/2018 11/22/2018    Notes to Pharmacy: CLINTON PHILLIPS: cabinet override    Oxytocin-Lactated Ringers (PITOCIN) 20 UNIT/L in lactated Ringer's 1000 mL IVPB 1,000 mL/hr Continuous 11/22/2018 11/22/2018    Sig - Route: Infuse 20 Units/hr into a venous catheter Continuous. - Intravenous    prenatal vitamin 27-0.8 tablet 1 tablet 1 tablet Daily 11/22/2018     Sig - Route: Take 1 tablet by mouth Daily. - Oral    promethazine (PHENERGAN) injection 12.5 mg 12.5 mg Every 6 Hours PRN 11/22/2018     Sig - Route: Inject 0.5 mL into the appropriate muscle as directed by prescriber Every 6 (Six) Hours As Needed for Nausea or Vomiting. - Intramuscular    Linked Group 2:  \"Or\" Linked Group Details        promethazine (PHENERGAN) suppository 12.5 mg 12.5 mg Every 6 Hours PRN 11/22/2018     Sig - Route: Insert 1 suppository into the rectum Every 6 (Six) Hours As Needed for Nausea or Vomiting. - Rectal    Linked Group 2:  \"Or\" Linked Group Details        promethazine (PHENERGAN) tablet 25 mg 25 mg Every 6 Hours PRN 11/22/2018     Sig - Route: Take 1 tablet by mouth Every 6 (Six) Hours As Needed for " "Nausea or Vomiting. - Oral    Linked Group 2:  \"Or\" Linked Group Details        Sod Citrate-Citric Acid (BICITRA) solution 30 mL 30 mL Once 11/22/2018 11/22/2018    Sig - Route: Take 30 mL by mouth 1 (One) Time. - Oral    sodium chloride 0.9 % flush 3 mL 3 mL Every 12 Hours Scheduled 11/22/2018     Sig - Route: Infuse 3 mL into a venous catheter Every 12 (Twelve) Hours. - Intravenous    sodium chloride 0.9 % flush 3-10 mL 3-10 mL As Needed 11/22/2018     Sig - Route: Infuse 3-10 mL into a venous catheter As Needed for Line Care. - Intravenous    acetaminophen (TYLENOL) tablet 650 mg (Discontinued) 650 mg Every 4 Hours PRN 11/22/2018 11/22/2018    Sig - Route: Take 2 tablets by mouth Every 4 (Four) Hours As Needed for Mild Pain  or Headache. - Oral    Reason for Discontinue: Patient Transfer    diphenhydrAMINE (BENADRYL) capsule 25 mg (Discontinued) 25 mg Nightly PRN 11/22/2018 11/22/2018    Sig - Route: Take 1 capsule by mouth At Night As Needed for Sleep. - Oral    Reason for Discontinue: Patient Transfer    Linked Group 3:  \"Or\" Linked Group Details        diphenhydrAMINE (BENADRYL) injection 25 mg (Discontinued) 25 mg Nightly PRN 11/22/2018 11/22/2018    Sig - Route: Infuse 0.5 mL into a venous catheter At Night As Needed for Sleep. - Intravenous    Linked Group 3:  \"Or\" Linked Group Details        ibuprofen (ADVIL,MOTRIN) tablet 600 mg (Discontinued) 600 mg Every 6 Hours PRN 11/22/2018 11/22/2018    Sig - Route: Take 1 tablet by mouth Every 6 (Six) Hours As Needed for Mild Pain . - Oral    lactated ringers infusion (Discontinued) 125 mL/hr Continuous 11/22/2018 11/22/2018    Sig - Route: Infuse 125 mL/hr into a venous catheter Continuous. - Intravenous    lidocaine PF 1% (XYLOCAINE) injection 5 mL (Discontinued) 5 mL As Needed 11/22/2018 11/22/2018    Sig - Route: Inject 5 mL into the appropriate area of the skin as directed by provider As Needed (IV starts). - Intradermal    Reason for Discontinue: Patient " "Transfer    ondansetron (ZOFRAN) injection 4 mg (Discontinued) 4 mg Every 6 Hours PRN 11/22/2018 11/22/2018    Sig - Route: Infuse 2 mL into a venous catheter Every 6 (Six) Hours As Needed for Nausea or Vomiting. - Intravenous    Linked Group 4:  \"Or\" Linked Group Details        ondansetron (ZOFRAN) tablet 4 mg (Discontinued) 4 mg Every 6 Hours PRN 11/22/2018 11/22/2018    Sig - Route: Take 1 tablet by mouth Every 6 (Six) Hours As Needed for Nausea or Vomiting. - Oral    Linked Group 4:  \"Or\" Linked Group Details        ondansetron ODT (ZOFRAN-ODT) disintegrating tablet 4 mg (Discontinued) 4 mg Every 6 Hours PRN 11/22/2018 11/22/2018    Sig - Route: Take 1 tablet by mouth Every 6 (Six) Hours As Needed for Nausea or Vomiting. - Oral    Reason for Discontinue: Patient Transfer    Linked Group 4:  \"Or\" Linked Group Details        oxyCODONE-acetaminophen (PERCOCET)  MG per tablet 1 tablet (Discontinued) 1 tablet Every 4 Hours PRN 11/22/2018 11/22/2018    Sig - Route: Take 1 tablet by mouth Every 4 (Four) Hours As Needed for Moderate Pain . - Oral    Reason for Discontinue: Patient Transfer    Oxytocin-Lactated Ringers (PITOCIN) 20 UNIT/L in lactated Ringer's 1000 mL IVPB (Discontinued) 999 mL/hr Once 11/22/2018 11/22/2018    Sig - Route: Infuse 19.98 Units/hr into a venous catheter 1 (One) Time. - Intravenous    Reason for Discontinue: Patient Transfer    Linked Group 5:  \"Followed by\" Linked Group Details        Oxytocin-Lactated Ringers (PITOCIN) 20 UNIT/L in lactated Ringer's 1000 mL IVPB (Discontinued) 125 mL/hr As Needed 11/22/2018 11/22/2018    Sig - Route: Infuse 2.5 Units/hr into a venous catheter As Needed for Bleeding. - Intravenous    Reason for Discontinue: Patient Transfer    Linked Group 5:  \"Followed by\" Linked Group Details        promethazine (PHENERGAN) injection 12.5 mg (Discontinued) 12.5 mg Every 6 Hours PRN 11/22/2018 11/22/2018    Sig - Route: Infuse 0.5 mL into a venous catheter Every 6 " "(Six) Hours As Needed for Nausea or Vomiting. - Intravenous    Linked Group 6:  \"Or\" Linked Group Details        promethazine (PHENERGAN) injection 12.5 mg (Discontinued) 12.5 mg Every 6 Hours PRN 11/22/2018 11/22/2018    Sig - Route: Inject 0.5 mL into the appropriate muscle as directed by prescriber Every 6 (Six) Hours As Needed for Nausea or Vomiting. - Intramuscular    Reason for Discontinue: Patient Transfer    Linked Group 6:  \"Or\" Linked Group Details        promethazine (PHENERGAN) suppository 12.5 mg (Discontinued) 12.5 mg Every 6 Hours PRN 11/22/2018 11/22/2018    Sig - Route: Insert 1 suppository into the rectum Every 6 (Six) Hours As Needed for Nausea or Vomiting. - Rectal    Linked Group 6:  \"Or\" Linked Group Details        promethazine (PHENERGAN) tablet 12.5 mg (Discontinued) 12.5 mg Every 6 Hours PRN 11/22/2018 11/22/2018    Sig - Route: Take 1 tablet by mouth Every 6 (Six) Hours As Needed for Nausea or Vomiting. - Oral    Reason for Discontinue: Patient Transfer    Linked Group 6:  \"Or\" Linked Group Details        sodium chloride 0.9 % flush 3 mL (Discontinued) 3 mL Every 12 Hours Scheduled 11/22/2018 11/22/2018    Sig - Route: Infuse 3 mL into a venous catheter Every 12 (Twelve) Hours. - Intravenous    Reason for Discontinue: Patient Transfer    sodium chloride 0.9 % flush 3-10 mL (Discontinued) 3-10 mL As Needed 11/22/2018 11/22/2018    Sig - Route: Infuse 3-10 mL into a venous catheter As Needed for Line Care. - Intravenous    Reason for Discontinue: Patient Transfer          Lab Results (last 72 hours)     Procedure Component Value Units Date/Time    CBC & Differential [[id number]] Collected:  11/23/18 0617    Specimen:  Blood Updated:  11/23/18 0628    Narrative:       The following orders were created for panel order CBC & Differential.  Procedure                               Abnormality         Status                     ---------                               -----------         ------       "               CBC Auto Differential[[id number]]        Abnormal            Final result                 Please view results for these tests on the individual orders.    CBC Auto Differential [[id number]]  (Abnormal) Collected:  11/23/18 0617    Specimen:  Blood Updated:  11/23/18 0628     WBC 15.99 10*3/mm3      RBC 3.39 10*6/mm3      Hemoglobin 10.3 g/dL      Hematocrit 29.7 %      MCV 87.6 fL      MCH 30.4 pg      MCHC 34.7 g/dL      RDW 14.3 %      RDW-SD 45.2 fl      MPV 11.9 fL      Platelets 147 10*3/mm3      Neutrophil % 83.0 %      Lymphocyte % 10.3 %      Monocyte % 5.5 %      Eosinophil % 0.8 %      Basophil % 0.1 %      Immature Grans % 0.3 %      Neutrophils, Absolute 13.28 10*3/mm3      Lymphocytes, Absolute 1.64 10*3/mm3      Monocytes, Absolute 0.88 10*3/mm3      Eosinophils, Absolute 0.13 10*3/mm3      Basophils, Absolute 0.02 10*3/mm3      Immature Grans, Absolute 0.04 10*3/mm3     CBC (No Diff) [[id number]]  (Abnormal) Collected:  11/22/18 0729    Specimen:  Blood Updated:  11/22/18 0745     WBC 11.79 10*3/mm3      RBC 3.67 10*6/mm3      Hemoglobin 11.1 g/dL      Hematocrit 32.2 %      MCV 87.7 fL      MCH 30.2 pg      MCHC 34.5 g/dL      RDW 14.3 %      RDW-SD 45.9 fl      MPV 11.8 fL      Platelets 161 10*3/mm3     Urine Drug Screen - [[id number]]  (Normal) Collected:  11/22/18 0643    Specimen:  Urine Updated:  11/22/18 0719     Amphetamine Screen, Urine Negative     Barbiturates Screen, Urine Negative     Benzodiazepine Screen, Urine Negative     Cocaine Screen, Urine Negative     Methadone Screen, Urine Negative     Opiate Screen Negative     Oxycodone Screen, Urine Negative     THC, Screen, Urine Negative    Narrative:       Negative Thresholds For Drugs Screened in Urine:     Amphetamines          500 ng/ml  Barbiturates          200 ng/ml  Benzodiazepines       200 ng/ml  Cocaine               150 ng/ml  Methadone             300 ng/mL  Opiates               300 ng/mL  Oxycodone             100  ng/mL  THC                   20 ng/mL    The normal value for all drugs tested is negative. This report includes final unconfirmed screening results.  A positive result by this assay can be, at your request, sent to the Reference Lab for confirmation by gas chromatography. Unconfirmed results must not be used for non-medical purposes, such as employment or legal testing. Clinical consideration should be applied to any drug of abuse test result, particularly when unconfirmed results are used.             Operative/Procedure Notes (last 72 hours) (Notes from 2018  9:00 AM through 2018  9:00 AM)      Farzana Montiel MD at 2018  9:27 AM         SECTION DICTATION    Preoperative Diagnosis:   1. IUP 39w0d  2. Previous  section, desires repeat    Postoperative Diagnosis:   1. Same, delivered via RLTCS    Procedure: Repeat Low Transverse  Section  Surgeon: Farzana Montiel MD  Assist: Felicitas Graham  Anesthesia: Spinal  EBL: 1000 mL  Fluids: 1700 mL    Findings: Live female infant in vertex presentation. Clear amniotic fluid. Weight of 7#1oz, Apgars 9/10 . Normal uterus, tubes, ovaries bilaterally.    Indications: This is a 24 yo  @ 39w0d with a history of a previous  section that desires a repeat  for delivery.   She came in reporting contractions and given that she was 39 weeks, we decided to proceed with delivery.    A thorough consultation regarding the complications, risks, indications, benefits, failures, and alternatives were discussed with the patient including but not limited to bleeding, infection and injury to adjacent organs. The patient desired to proceed with the surgery. All questions and concerns were answered and addressed before proceeding to the operating room.    Description of Procedure:   After assuring informed consent, the patient was taken to the operating room and spinal anesthesia was initiated. The patient was placed in the dorsal,  supine position with left lateral tilt. The abdomen was prepped and draped in sterile fashion. Time out was performed. Anesthesia was found to be adequate.     A Pfannenstiel skin incision was made with a scalpel and carried through to the level of the fascia. The fascial incision was extended bilaterally with Mcintosh scissors. The rectus muscles were then dissected both sharply and bluntly superiorly from the fascia while tenting the fascia up with the Kocher clamps.    The rectus muscles were then  in the midline, and the peritoneum was then entered bluntly. The peritoneal incision was extended superiorly and inferiorly with good visualization of the bladder.    A bladder blade was then inserted, and with the bladder well out of they way of the anticipate hysterotomy site the lower uterine segment was incised in a transverse fashion with the scalpel and then extended bilaterally.    The bladder blade was removed, and the infant's head was delivered atraumatically. The nose and mouth were suctioned and the cord was clamped and cut. The infant was handed off to the awaiting nursing staff. Cord segment and blood were collected.     The placenta was then removed manually. It was still delivered intact and a 3 vessel cord was identified. The uterus was exteriorized.  The uterus was cleared of all clots. The uterine incision was repaired in a single layer with 0 Vicryl suture in a standard running locking fashion with excellent hemostasis achieved. A second layer of 0 Vicryl was placed as an imbricating layer. Hemostasis was noted. Normal uterus and b/l tube and ovaries noted. Pelvic gutters evacuated of all debris.  The uterus was returned to abdomen, and attention was turned back to the hysterotomy site, hemostasis remained.  Gel foam was placed to ensure hemostasis.     The fascia was re-approximated with 0 Vicryl in a running fashion. The subcutaneous tissue were irrigated and rendered hemostatic with bovie  cautery.  The space was closed with running sutures of 2-0 plain. The skin was closed with 3-0 Monocryl and a pressure dressing was applied.     The patient tolerated the procedure well. Needle, lap, and sponge counts were correct times two. Two grams of Kefzol were given prior to the procedure, and a sterile dressing was placed over the incision.    Complications: None.  Specimens: Cord blood          This document has been electronically signed by Farzana Montiel MD on 2018 9:30 AM                  Electronically signed by Farzana Montiel MD at 2018  9:30 AM          Physician Progress Notes (last 72 hours) (Notes from 2018  9:00 AM through 2018  9:00 AM)      Farzana Montiel MD at 2018  5:28 AM          PP Progress note    Patient states she is a little sore.  +amb, +voiding, +flatus. Lochia minimal.  Would like to go home today.  Wants Depo prior to discharge.     Vitals:    18 1930 18 2052 18 2130 18 0133   BP: 118/57  120/63 112/68   BP Location: Right arm  Right arm Right arm   Patient Position: Lying  Lying Sitting   Pulse: 77 105 82 105   Resp:    Temp: 98 °F (36.7 °C)  98.1 °F (36.7 °C) 98.8 °F (37.1 °C)   TempSrc: Oral  Oral Oral   SpO2: 99% 99% 99% 98%     General - sitting up in bed, AAOx3  Abd - soft, nttp, fundus firm below umbilicus; incision c/d/i   Ext - no CT bilaterally     A/P: 24 yo  POD#1 s/p RLTCS doing well.  1. Meeting all discharge criteria, will discharge home today with follow up in 2 weeks.           This document has been electronically signed by Farzana Montiel MD on 2018 5:29 AM        Electronically signed by Farzana Montiel MD at 2018  5:30 AM

## 2018-11-23 NOTE — PLAN OF CARE
Problem: Patient Care Overview  Goal: Individualization and Mutuality  Outcome: Outcome(s) achieved Date Met: 18    Goal: Discharge Needs Assessment  Outcome: Unable to achieve outcome(s) by discharge Date Met: 18    Goal: Interprofessional Rounds/Family Conf  Outcome: Outcome(s) achieved Date Met: 18      Problem: Postpartum ( Delivery) (Adult,Obstetrics,Pediatric)  Goal: Signs and Symptoms of Listed Potential Problems Will be Absent, Minimized or Managed (Postpartum)  Outcome: Outcome(s) achieved Date Met: 18    Goal: Anesthesia/Sedation Recovery  Outcome: Outcome(s) achieved Date Met: 18      Problem: Fall Risk (Adult)  Goal: Identify Related Risk Factors and Signs and Symptoms  Outcome: Outcome(s) achieved Date Met: 18    Goal: Absence of Fall  Outcome: Outcome(s) achieved Date Met: 18

## 2018-11-23 NOTE — PROGRESS NOTES
PP Progress note    Patient states she is a little sore.  +amb, +voiding, +flatus. Lochia minimal.  Would like to go home today.  Wants Depo prior to discharge.     Vitals:    18 1930 180 18 0133   BP: 118/57  120/63 112/68   BP Location: Right arm  Right arm Right arm   Patient Position: Lying  Lying Sitting   Pulse: 77 105 82 105   Resp:    Temp: 98 °F (36.7 °C)  98.1 °F (36.7 °C) 98.8 °F (37.1 °C)   TempSrc: Oral  Oral Oral   SpO2: 99% 99% 99% 98%     General - sitting up in bed, AAOx3  Abd - soft, nttp, fundus firm below umbilicus; incision c/d/i   Ext - no CT bilaterally     A/P: 24 yo  POD#1 s/p RLTCS doing well.  1. Meeting all discharge criteria, will discharge home today with follow up in 2 weeks.           This document has been electronically signed by Farzana Montiel MD on 2018 5:29 AM

## 2018-11-23 NOTE — DISCHARGE INSTR - ACTIVITY
"Notify Dr of... heavy bleeding, passing clots, foul odor to your discharge, temperature above 100.4, burning when urinating, gapping or drainage from incision or episiotomy, or for pain not relieved by taking pain medication, redness or streaking in breasts, pain or redness in legs.    Take all medications as prescribed.  Continue taking iron or prenatal vitamin while breastfeeding or until you run out.  Take rest periods several times during the day.  \"Baby Blues\" are normal and may be present around the 3rd-4th day after delivery. If they last longer than 2-3 days, please let your Dr know.  Pelvic rest for 6 weeks. No douching, tampons, or intercourse  No driving for 2 weeks  No lifting anything heavier than the baby OR 10 LBS  Wear a good supportive bra 24 hours/day to prevent engorgement    "

## 2018-11-23 NOTE — DISCHARGE SUMMARY
Discharge Summary:    Admission date: 2018  Discharge date: 2018    Admitting Diagnosis:  1. Term IUP @ 39w0d  2. Previous  section, desires repeat    Discharge Diagnosis:  1. Same, delivery by repeat  section    History and Hospital Course:  Patient is a  @ 39w0d who presents with complaints of contractions.  She was found not to be in labor, but given her desires for repeat section and gestational age, she was admitted for repeat section. Her pregnancy was complicated by a history of previous  section.      She was admitted and underwent a repeat section.  She delivered a viable female infant with weights 3204g and APGARs of 9/10.  No immediate complications were encountered.      Her postpartum course has been unremarkable.  She had no signs or symptoms of acute blood loss anemia.  She was ambulating well, passing flatus, voiding without difficulty and lochia was within normal limits.  She was bottle feeding without difficulty.  She was stable for discharge on postpartum day 1.      Precautions and instructions were discussed with her including but not limited to maintaining a regular diet at home, practicing local hygiene, pelvic rest, and signs and symptoms to report including heavy bleeding, frequent passage of clots, fainting or dizziness, foul odor of lochia, increasing pain, fever, or any other concerns.  She was asked to follow up in the office in 2 weeks.      Discharge diet: regular  Discharge condition: stable  Discharge to: home  Follow up in 2 weeks for incision check.        Discharge Medications      New Medications      Instructions Start Date   docusate sodium 100 MG capsule   100 mg, Oral, 2 Times Daily PRN      ibuprofen 600 MG tablet  Commonly known as:  ADVIL,MOTRIN   600 mg, Oral, Every 6 Hours PRN      oxyCODONE-acetaminophen 5-325 MG per tablet  Commonly known as:  PERCOCET   1-2 tabs PO every 4-6 hours prn pain         Continue These Medications       Instructions Start Date   PRENATAL GUMMY VITAMIN   1 each, Oral, Daily         Stop These Medications    raNITIdine 150 MG tablet  Commonly known as:  ZANTAC                This document has been electronically signed by Farzana Montiel MD on November 23, 2018 5:35 AM

## 2018-11-27 ENCOUNTER — ANESTHESIA (OUTPATIENT)
Dept: LABOR AND DELIVERY | Facility: HOSPITAL | Age: 23
End: 2018-11-27

## 2019-02-21 RX ORDER — MEDROXYPROGESTERONE ACETATE 150 MG/ML
150 INJECTION, SUSPENSION INTRAMUSCULAR
Qty: 1 ML | Refills: 3 | Status: ON HOLD | OUTPATIENT
Start: 2019-02-21 | End: 2021-03-16

## 2021-03-15 ENCOUNTER — HOSPITAL ENCOUNTER (INPATIENT)
Facility: HOSPITAL | Age: 26
LOS: 2 days | Discharge: HOME | End: 2021-03-17
Attending: OBSTETRICS & GYNECOLOGY | Admitting: OBSTETRICS & GYNECOLOGY

## 2021-03-15 DIAGNOSIS — Z37.9 NORMAL LABOR: Primary | ICD-10-CM

## 2021-03-15 LAB — A1 MICROGLOB PLACENTAL VAG QL: NEGATIVE

## 2021-03-15 PROCEDURE — G0480 DRUG TEST DEF 1-7 CLASSES: HCPCS | Performed by: OBSTETRICS & GYNECOLOGY

## 2021-03-15 PROCEDURE — 80306 DRUG TEST PRSMV INSTRMNT: CPT | Performed by: OBSTETRICS & GYNECOLOGY

## 2021-03-15 PROCEDURE — 80081 OBSTETRIC PANEL INC HIV TSTG: CPT | Performed by: OBSTETRICS & GYNECOLOGY

## 2021-03-15 PROCEDURE — 99222 1ST HOSP IP/OBS MODERATE 55: CPT | Performed by: OBSTETRICS & GYNECOLOGY

## 2021-03-15 PROCEDURE — 36415 COLL VENOUS BLD VENIPUNCTURE: CPT | Performed by: OBSTETRICS & GYNECOLOGY

## 2021-03-15 PROCEDURE — 84112 EVAL AMNIOTIC FLUID PROTEIN: CPT | Performed by: OBSTETRICS & GYNECOLOGY

## 2021-03-15 PROCEDURE — 86803 HEPATITIS C AB TEST: CPT | Performed by: OBSTETRICS & GYNECOLOGY

## 2021-03-15 RX ORDER — SODIUM CHLORIDE 0.9 % (FLUSH) 0.9 %
3 SYRINGE (ML) INJECTION EVERY 12 HOURS SCHEDULED
Status: DISCONTINUED | OUTPATIENT
Start: 2021-03-16 | End: 2021-03-16 | Stop reason: HOSPADM

## 2021-03-15 RX ORDER — LIDOCAINE HYDROCHLORIDE 10 MG/ML
5 INJECTION, SOLUTION EPIDURAL; INFILTRATION; INTRACAUDAL; PERINEURAL AS NEEDED
Status: DISCONTINUED | OUTPATIENT
Start: 2021-03-15 | End: 2021-03-16 | Stop reason: HOSPADM

## 2021-03-15 RX ORDER — SODIUM CHLORIDE 0.9 % (FLUSH) 0.9 %
3-10 SYRINGE (ML) INJECTION AS NEEDED
Status: DISCONTINUED | OUTPATIENT
Start: 2021-03-15 | End: 2021-03-16 | Stop reason: HOSPADM

## 2021-03-15 RX ORDER — TRISODIUM CITRATE DIHYDRATE AND CITRIC ACID MONOHYDRATE 500; 334 MG/5ML; MG/5ML
30 SOLUTION ORAL ONCE
Status: DISCONTINUED | OUTPATIENT
Start: 2021-03-16 | End: 2021-03-16 | Stop reason: HOSPADM

## 2021-03-15 RX ORDER — SODIUM CHLORIDE, SODIUM LACTATE, POTASSIUM CHLORIDE, CALCIUM CHLORIDE 600; 310; 30; 20 MG/100ML; MG/100ML; MG/100ML; MG/100ML
INJECTION, SOLUTION INTRAVENOUS
Status: COMPLETED
Start: 2021-03-15 | End: 2021-03-16

## 2021-03-16 ENCOUNTER — ANESTHESIA (OUTPATIENT)
Dept: LABOR AND DELIVERY | Facility: HOSPITAL | Age: 26
End: 2021-03-16

## 2021-03-16 ENCOUNTER — ANESTHESIA EVENT (OUTPATIENT)
Dept: LABOR AND DELIVERY | Facility: HOSPITAL | Age: 26
End: 2021-03-16

## 2021-03-16 LAB
ABO GROUP BLD: NORMAL
AMPHET+METHAMPHET UR QL: POSITIVE
AMPHETAMINES UR QL: POSITIVE
BARBITURATES UR QL SCN: NEGATIVE
BASOPHILS # BLD AUTO: 0.07 10*3/MM3 (ref 0–0.2)
BASOPHILS NFR BLD AUTO: 0.5 % (ref 0–1.5)
BENZODIAZ UR QL SCN: NEGATIVE
BLD GP AB SCN SERPL QL: NEGATIVE
BUPRENORPHINE SERPL-MCNC: NEGATIVE NG/ML
C TRACH RRNA CVX QL NAA+PROBE: NEGATIVE
CANNABINOIDS SERPL QL: NEGATIVE
COCAINE UR QL: NEGATIVE
DEPRECATED RDW RBC AUTO: 45.6 FL (ref 37–54)
EOSINOPHIL # BLD AUTO: 0.23 10*3/MM3 (ref 0–0.4)
EOSINOPHIL NFR BLD AUTO: 1.5 % (ref 0.3–6.2)
ERYTHROCYTE [DISTWIDTH] IN BLOOD BY AUTOMATED COUNT: 16.1 % (ref 12.3–15.4)
HBV SURFACE AG SERPL QL IA: NORMAL
HCT VFR BLD AUTO: 29.7 % (ref 34–46.6)
HCV AB SER DONR QL: NORMAL
HGB BLD-MCNC: 9.7 G/DL (ref 12–15.9)
HIV1+2 AB SER QL: NORMAL
IMM GRANULOCYTES # BLD AUTO: 0.09 10*3/MM3 (ref 0–0.05)
IMM GRANULOCYTES NFR BLD AUTO: 0.6 % (ref 0–0.5)
LYMPHOCYTES # BLD AUTO: 2.06 10*3/MM3 (ref 0.7–3.1)
LYMPHOCYTES NFR BLD AUTO: 13.4 % (ref 19.6–45.3)
Lab: NORMAL
MCH RBC QN AUTO: 25.9 PG (ref 26.6–33)
MCHC RBC AUTO-ENTMCNC: 32.7 G/DL (ref 31.5–35.7)
MCV RBC AUTO: 79.4 FL (ref 79–97)
METHADONE UR QL SCN: NEGATIVE
MONOCYTES # BLD AUTO: 1.13 10*3/MM3 (ref 0.1–0.9)
MONOCYTES NFR BLD AUTO: 7.4 % (ref 5–12)
N GONORRHOEA RRNA SPEC QL NAA+PROBE: NEGATIVE
NEUTROPHILS NFR BLD AUTO: 11.74 10*3/MM3 (ref 1.7–7)
NEUTROPHILS NFR BLD AUTO: 76.6 % (ref 42.7–76)
NRBC BLD AUTO-RTO: 0 /100 WBC (ref 0–0.2)
OPIATES UR QL: NEGATIVE
OXYCODONE UR QL SCN: NEGATIVE
PCP UR QL SCN: NEGATIVE
PLATELET # BLD AUTO: 230 10*3/MM3 (ref 140–450)
PMV BLD AUTO: 10.9 FL (ref 6–12)
PROPOXYPH UR QL: NEGATIVE
RBC # BLD AUTO: 3.74 10*6/MM3 (ref 3.77–5.28)
RH BLD: POSITIVE
RPR SER QL: NORMAL
RUBV IGG SERPL IA-ACNC: NORMAL
TRICHOMONAS VAGINALIS PCR: NEGATIVE
TRICYCLICS UR QL SCN: NEGATIVE
WBC # BLD AUTO: 15.32 10*3/MM3 (ref 3.4–10.8)

## 2021-03-16 PROCEDURE — 94799 UNLISTED PULMONARY SVC/PX: CPT

## 2021-03-16 PROCEDURE — 25010000003 CEFAZOLIN PER 500 MG: Performed by: NURSE ANESTHETIST, CERTIFIED REGISTERED

## 2021-03-16 PROCEDURE — 25010000002 ONDANSETRON PER 1 MG: Performed by: NURSE ANESTHETIST, CERTIFIED REGISTERED

## 2021-03-16 PROCEDURE — 51703 INSERT BLADDER CATH COMPLEX: CPT

## 2021-03-16 PROCEDURE — 25010000002 KETOROLAC TROMETHAMINE PER 15 MG: Performed by: NURSE ANESTHETIST, CERTIFIED REGISTERED

## 2021-03-16 PROCEDURE — 87661 TRICHOMONAS VAGINALIS AMPLIF: CPT | Performed by: OBSTETRICS & GYNECOLOGY

## 2021-03-16 PROCEDURE — 25010000002 KETOROLAC TROMETHAMINE PER 15 MG: Performed by: OBSTETRICS & GYNECOLOGY

## 2021-03-16 PROCEDURE — 25010000002 AZITHROMYCIN 500 MG/250 ML: Performed by: OBSTETRICS & GYNECOLOGY

## 2021-03-16 PROCEDURE — 25010000002 FENTANYL CITRATE (PF) 100 MCG/2ML SOLUTION: Performed by: NURSE ANESTHETIST, CERTIFIED REGISTERED

## 2021-03-16 PROCEDURE — 88312 SPECIAL STAINS GROUP 1: CPT

## 2021-03-16 PROCEDURE — 87491 CHLMYD TRACH DNA AMP PROBE: CPT | Performed by: OBSTETRICS & GYNECOLOGY

## 2021-03-16 PROCEDURE — 25010000002 CEFAZOLIN PER 500 MG: Performed by: OBSTETRICS & GYNECOLOGY

## 2021-03-16 PROCEDURE — 25010000002 MIDAZOLAM PER 1 MG: Performed by: NURSE ANESTHETIST, CERTIFIED REGISTERED

## 2021-03-16 PROCEDURE — 25010000002 PROPOFOL 10 MG/ML EMULSION: Performed by: NURSE ANESTHETIST, CERTIFIED REGISTERED

## 2021-03-16 PROCEDURE — 87591 N.GONORRHOEAE DNA AMP PROB: CPT | Performed by: OBSTETRICS & GYNECOLOGY

## 2021-03-16 PROCEDURE — 25010000002 HYDROMORPHONE PER 4 MG: Performed by: OBSTETRICS & GYNECOLOGY

## 2021-03-16 PROCEDURE — 88307 TISSUE EXAM BY PATHOLOGIST: CPT

## 2021-03-16 PROCEDURE — 59514 CESAREAN DELIVERY ONLY: CPT | Performed by: OBSTETRICS & GYNECOLOGY

## 2021-03-16 RX ORDER — ONDANSETRON 2 MG/ML
4 INJECTION INTRAMUSCULAR; INTRAVENOUS EVERY 6 HOURS PRN
Status: DISCONTINUED | OUTPATIENT
Start: 2021-03-16 | End: 2021-03-16 | Stop reason: SDUPTHER

## 2021-03-16 RX ORDER — SODIUM CHLORIDE, SODIUM LACTATE, POTASSIUM CHLORIDE, CALCIUM CHLORIDE 600; 310; 30; 20 MG/100ML; MG/100ML; MG/100ML; MG/100ML
INJECTION, SOLUTION INTRAVENOUS
Status: COMPLETED
Start: 2021-03-16 | End: 2021-03-16

## 2021-03-16 RX ORDER — HYDROCORTISONE 25 MG/G
CREAM TOPICAL 3 TIMES DAILY PRN
Status: DISCONTINUED | OUTPATIENT
Start: 2021-03-16 | End: 2021-03-17 | Stop reason: HOSPADM

## 2021-03-16 RX ORDER — ACETAMINOPHEN 500 MG
1000 TABLET ORAL EVERY 6 HOURS
Status: DISCONTINUED | OUTPATIENT
Start: 2021-03-16 | End: 2021-03-16

## 2021-03-16 RX ORDER — OXYCODONE HYDROCHLORIDE 5 MG/1
10 TABLET ORAL EVERY 4 HOURS PRN
Status: DISCONTINUED | OUTPATIENT
Start: 2021-03-17 | End: 2021-03-16

## 2021-03-16 RX ORDER — OXYCODONE HYDROCHLORIDE 5 MG/1
5 TABLET ORAL EVERY 4 HOURS PRN
Status: DISCONTINUED | OUTPATIENT
Start: 2021-03-17 | End: 2021-03-16

## 2021-03-16 RX ORDER — ONDANSETRON 4 MG/1
4 TABLET, FILM COATED ORAL EVERY 6 HOURS PRN
Status: DISCONTINUED | OUTPATIENT
Start: 2021-03-16 | End: 2021-03-16 | Stop reason: SDUPTHER

## 2021-03-16 RX ORDER — PROPOFOL 10 MG/ML
VIAL (ML) INTRAVENOUS AS NEEDED
Status: DISCONTINUED | OUTPATIENT
Start: 2021-03-16 | End: 2021-03-16 | Stop reason: SURG

## 2021-03-16 RX ORDER — OXYTOCIN/0.9 % SODIUM CHLORIDE 30/500 ML
650 PLASTIC BAG, INJECTION (ML) INTRAVENOUS ONCE
Status: DISCONTINUED | OUTPATIENT
Start: 2021-03-16 | End: 2021-03-17 | Stop reason: HOSPADM

## 2021-03-16 RX ORDER — BUPIVACAINE HYDROCHLORIDE 7.5 MG/ML
INJECTION, SOLUTION EPIDURAL; RETROBULBAR
Status: COMPLETED | OUTPATIENT
Start: 2021-03-16 | End: 2021-03-16

## 2021-03-16 RX ORDER — OXYTOCIN/0.9 % SODIUM CHLORIDE 30/500 ML
650 PLASTIC BAG, INJECTION (ML) INTRAVENOUS ONCE
Status: DISCONTINUED | OUTPATIENT
Start: 2021-03-16 | End: 2021-03-16 | Stop reason: SDUPTHER

## 2021-03-16 RX ORDER — OXYTOCIN/0.9 % SODIUM CHLORIDE 30/500 ML
85 PLASTIC BAG, INJECTION (ML) INTRAVENOUS ONCE
Status: DISCONTINUED | OUTPATIENT
Start: 2021-03-16 | End: 2021-03-17 | Stop reason: HOSPADM

## 2021-03-16 RX ORDER — OXYTOCIN/0.9 % SODIUM CHLORIDE 30/500 ML
85 PLASTIC BAG, INJECTION (ML) INTRAVENOUS ONCE
Status: DISCONTINUED | OUTPATIENT
Start: 2021-03-16 | End: 2021-03-16 | Stop reason: SDUPTHER

## 2021-03-16 RX ORDER — LANOLIN 100 %
OINTMENT (GRAM) TOPICAL
Status: DISCONTINUED | OUTPATIENT
Start: 2021-03-16 | End: 2021-03-17 | Stop reason: HOSPADM

## 2021-03-16 RX ORDER — BUPIVACAINE HCL/0.9 % NACL/PF 0.1 %
2 PLASTIC BAG, INJECTION (ML) EPIDURAL ONCE
Status: COMPLETED | OUTPATIENT
Start: 2021-03-16 | End: 2021-03-16

## 2021-03-16 RX ORDER — OXYTOCIN/0.9 % SODIUM CHLORIDE 30/500 ML
PLASTIC BAG, INJECTION (ML) INTRAVENOUS
Status: DISPENSED
Start: 2021-03-16 | End: 2021-03-16

## 2021-03-16 RX ORDER — SODIUM CHLORIDE, SODIUM LACTATE, POTASSIUM CHLORIDE, CALCIUM CHLORIDE 600; 310; 30; 20 MG/100ML; MG/100ML; MG/100ML; MG/100ML
125 INJECTION, SOLUTION INTRAVENOUS CONTINUOUS
Status: DISCONTINUED | OUTPATIENT
Start: 2021-03-16 | End: 2021-03-17 | Stop reason: HOSPADM

## 2021-03-16 RX ORDER — SODIUM CHLORIDE, SODIUM LACTATE, POTASSIUM CHLORIDE, CALCIUM CHLORIDE 600; 310; 30; 20 MG/100ML; MG/100ML; MG/100ML; MG/100ML
INJECTION, SOLUTION INTRAVENOUS CONTINUOUS PRN
Status: DISCONTINUED | OUTPATIENT
Start: 2021-03-16 | End: 2021-03-16 | Stop reason: SURG

## 2021-03-16 RX ORDER — DIPHENHYDRAMINE HYDROCHLORIDE 50 MG/ML
25 INJECTION INTRAMUSCULAR; INTRAVENOUS EVERY 6 HOURS PRN
Status: DISCONTINUED | OUTPATIENT
Start: 2021-03-16 | End: 2021-03-16

## 2021-03-16 RX ORDER — DIPHENHYDRAMINE HCL 25 MG
25 CAPSULE ORAL EVERY 4 HOURS PRN
Status: DISCONTINUED | OUTPATIENT
Start: 2021-03-16 | End: 2021-03-17 | Stop reason: HOSPADM

## 2021-03-16 RX ORDER — PRENATAL VIT/IRON FUM/FOLIC AC 27MG-0.8MG
1 TABLET ORAL DAILY
Status: DISCONTINUED | OUTPATIENT
Start: 2021-03-16 | End: 2021-03-17 | Stop reason: HOSPADM

## 2021-03-16 RX ORDER — MIDAZOLAM HYDROCHLORIDE 1 MG/ML
INJECTION INTRAMUSCULAR; INTRAVENOUS AS NEEDED
Status: DISCONTINUED | OUTPATIENT
Start: 2021-03-16 | End: 2021-03-16 | Stop reason: SURG

## 2021-03-16 RX ORDER — KETOROLAC TROMETHAMINE 30 MG/ML
INJECTION, SOLUTION INTRAMUSCULAR; INTRAVENOUS AS NEEDED
Status: DISCONTINUED | OUTPATIENT
Start: 2021-03-16 | End: 2021-03-16 | Stop reason: SURG

## 2021-03-16 RX ORDER — CEFAZOLIN SODIUM 1 G/3ML
INJECTION, POWDER, FOR SOLUTION INTRAMUSCULAR; INTRAVENOUS AS NEEDED
Status: DISCONTINUED | OUTPATIENT
Start: 2021-03-16 | End: 2021-03-16 | Stop reason: SURG

## 2021-03-16 RX ORDER — HYDROMORPHONE HCL IN 0.9% NACL 0.2 MG/ML
PLASTIC BAG, INJECTION (ML) INTRAVENOUS CONTINUOUS
Status: DISCONTINUED | OUTPATIENT
Start: 2021-03-16 | End: 2021-03-16

## 2021-03-16 RX ORDER — FENTANYL CITRATE 50 UG/ML
INJECTION, SOLUTION INTRAMUSCULAR; INTRAVENOUS AS NEEDED
Status: DISCONTINUED | OUTPATIENT
Start: 2021-03-16 | End: 2021-03-16 | Stop reason: SURG

## 2021-03-16 RX ORDER — OXYCODONE HYDROCHLORIDE 5 MG/1
10 TABLET ORAL EVERY 4 HOURS PRN
Status: DISCONTINUED | OUTPATIENT
Start: 2021-03-16 | End: 2021-03-17 | Stop reason: HOSPADM

## 2021-03-16 RX ORDER — CALCIUM CARBONATE 200(500)MG
2 TABLET,CHEWABLE ORAL EVERY 6 HOURS PRN
Status: DISCONTINUED | OUTPATIENT
Start: 2021-03-16 | End: 2021-03-17 | Stop reason: HOSPADM

## 2021-03-16 RX ORDER — NALOXONE HCL 0.4 MG/ML
0.1 VIAL (ML) INJECTION
Status: DISCONTINUED | OUTPATIENT
Start: 2021-03-16 | End: 2021-03-16

## 2021-03-16 RX ORDER — ONDANSETRON 2 MG/ML
4 INJECTION INTRAMUSCULAR; INTRAVENOUS EVERY 6 HOURS PRN
Status: DISCONTINUED | OUTPATIENT
Start: 2021-03-16 | End: 2021-03-17 | Stop reason: HOSPADM

## 2021-03-16 RX ORDER — POLYETHYLENE GLYCOL 3350 17 G/17G
17 POWDER, FOR SOLUTION ORAL DAILY
Status: DISCONTINUED | OUTPATIENT
Start: 2021-03-16 | End: 2021-03-17 | Stop reason: HOSPADM

## 2021-03-16 RX ORDER — ONDANSETRON 2 MG/ML
4 INJECTION INTRAMUSCULAR; INTRAVENOUS ONCE AS NEEDED
Status: DISCONTINUED | OUTPATIENT
Start: 2021-03-16 | End: 2021-03-17 | Stop reason: HOSPADM

## 2021-03-16 RX ORDER — ONDANSETRON 4 MG/1
4 TABLET, FILM COATED ORAL EVERY 8 HOURS PRN
Status: DISCONTINUED | OUTPATIENT
Start: 2021-03-16 | End: 2021-03-17 | Stop reason: HOSPADM

## 2021-03-16 RX ORDER — ONDANSETRON 2 MG/ML
INJECTION INTRAMUSCULAR; INTRAVENOUS AS NEEDED
Status: DISCONTINUED | OUTPATIENT
Start: 2021-03-16 | End: 2021-03-16 | Stop reason: SURG

## 2021-03-16 RX ORDER — SIMETHICONE 80 MG
80 TABLET,CHEWABLE ORAL 4 TIMES DAILY
Status: DISCONTINUED | OUTPATIENT
Start: 2021-03-16 | End: 2021-03-17 | Stop reason: HOSPADM

## 2021-03-16 RX ORDER — IBUPROFEN 800 MG/1
800 TABLET ORAL EVERY 8 HOURS SCHEDULED
Status: DISCONTINUED | OUTPATIENT
Start: 2021-03-16 | End: 2021-03-16

## 2021-03-16 RX ORDER — DOCUSATE SODIUM 100 MG/1
100 CAPSULE, LIQUID FILLED ORAL 2 TIMES DAILY PRN
Status: DISCONTINUED | OUTPATIENT
Start: 2021-03-16 | End: 2021-03-17 | Stop reason: HOSPADM

## 2021-03-16 RX ORDER — ACETAMINOPHEN 500 MG
1000 TABLET ORAL EVERY 6 HOURS
Status: DISCONTINUED | OUTPATIENT
Start: 2021-03-16 | End: 2021-03-17 | Stop reason: HOSPADM

## 2021-03-16 RX ORDER — IBUPROFEN 800 MG/1
800 TABLET ORAL EVERY 8 HOURS SCHEDULED
Status: DISCONTINUED | OUTPATIENT
Start: 2021-03-17 | End: 2021-03-17 | Stop reason: HOSPADM

## 2021-03-16 RX ORDER — KETAMINE HCL IN NACL, ISO-OSM 100MG/10ML
SYRINGE (ML) INJECTION AS NEEDED
Status: DISCONTINUED | OUTPATIENT
Start: 2021-03-16 | End: 2021-03-16 | Stop reason: SURG

## 2021-03-16 RX ORDER — OXYCODONE HYDROCHLORIDE 5 MG/1
5 TABLET ORAL EVERY 4 HOURS PRN
Status: DISCONTINUED | OUTPATIENT
Start: 2021-03-16 | End: 2021-03-17 | Stop reason: HOSPADM

## 2021-03-16 RX ORDER — BUPIVACAINE HCL/0.9 % NACL/PF 0.1 %
2 PLASTIC BAG, INJECTION (ML) EPIDURAL ONCE
Status: DISCONTINUED | OUTPATIENT
Start: 2021-03-16 | End: 2021-03-16 | Stop reason: HOSPADM

## 2021-03-16 RX ORDER — KETOROLAC TROMETHAMINE 30 MG/ML
30 INJECTION, SOLUTION INTRAMUSCULAR; INTRAVENOUS EVERY 6 HOURS
Status: COMPLETED | OUTPATIENT
Start: 2021-03-16 | End: 2021-03-16

## 2021-03-16 RX ADMIN — PROPOFOL 20 MG: 10 INJECTION, EMULSION INTRAVENOUS at 01:28

## 2021-03-16 RX ADMIN — Medication 10 MG: at 01:10

## 2021-03-16 RX ADMIN — PROPOFOL 20 MG: 10 INJECTION, EMULSION INTRAVENOUS at 01:05

## 2021-03-16 RX ADMIN — FENTANYL CITRATE 25 MCG: 50 INJECTION INTRAMUSCULAR; INTRAVENOUS at 01:18

## 2021-03-16 RX ADMIN — SODIUM CHLORIDE, POTASSIUM CHLORIDE, SODIUM LACTATE AND CALCIUM CHLORIDE 125 ML/HR: 600; 310; 30; 20 INJECTION, SOLUTION INTRAVENOUS at 07:01

## 2021-03-16 RX ADMIN — KETOROLAC TROMETHAMINE 30 MG: 30 INJECTION, SOLUTION INTRAMUSCULAR at 01:26

## 2021-03-16 RX ADMIN — SODIUM CHLORIDE, POTASSIUM CHLORIDE, SODIUM LACTATE AND CALCIUM CHLORIDE: 600; 310; 30; 20 INJECTION, SOLUTION INTRAVENOUS at 00:32

## 2021-03-16 RX ADMIN — SIMETHICONE 80 MG: 80 TABLET, CHEWABLE ORAL at 11:02

## 2021-03-16 RX ADMIN — CEFAZOLIN SODIUM 2 G: 10 INJECTION, POWDER, FOR SOLUTION INTRAVENOUS at 11:02

## 2021-03-16 RX ADMIN — SIMETHICONE 80 MG: 80 TABLET, CHEWABLE ORAL at 20:04

## 2021-03-16 RX ADMIN — OXYCODONE 10 MG: 5 TABLET ORAL at 14:32

## 2021-03-16 RX ADMIN — CEFAZOLIN SODIUM 2 G: 1 INJECTION, POWDER, FOR SOLUTION INTRAMUSCULAR; INTRAVENOUS at 00:44

## 2021-03-16 RX ADMIN — MIDAZOLAM HYDROCHLORIDE 5 MG: 2 INJECTION, SOLUTION INTRAMUSCULAR; INTRAVENOUS at 01:02

## 2021-03-16 RX ADMIN — PROPOFOL 20 MG: 10 INJECTION, EMULSION INTRAVENOUS at 01:24

## 2021-03-16 RX ADMIN — FENTANYL CITRATE 25 MCG: 50 INJECTION INTRAMUSCULAR; INTRAVENOUS at 01:28

## 2021-03-16 RX ADMIN — ONDANSETRON HYDROCHLORIDE 4 MG: 2 INJECTION INTRAMUSCULAR; INTRAVENOUS at 00:32

## 2021-03-16 RX ADMIN — PRENATAL VIT W/ FE FUMARATE-FA TAB 27-0.8 MG 1 TABLET: 27-0.8 TAB at 11:02

## 2021-03-16 RX ADMIN — FENTANYL CITRATE 25 MCG: 50 INJECTION INTRAMUSCULAR; INTRAVENOUS at 01:23

## 2021-03-16 RX ADMIN — OXYCODONE 10 MG: 5 TABLET ORAL at 19:57

## 2021-03-16 RX ADMIN — PROPOFOL 30 MG: 10 INJECTION, EMULSION INTRAVENOUS at 01:02

## 2021-03-16 RX ADMIN — ACETAMINOPHEN 1000 MG: 500 TABLET ORAL at 06:13

## 2021-03-16 RX ADMIN — PROPOFOL 20 MG: 10 INJECTION, EMULSION INTRAVENOUS at 01:16

## 2021-03-16 RX ADMIN — KETOROLAC TROMETHAMINE 30 MG: 30 INJECTION, SOLUTION INTRAMUSCULAR; INTRAVENOUS at 13:51

## 2021-03-16 RX ADMIN — KETOROLAC TROMETHAMINE 30 MG: 30 INJECTION, SOLUTION INTRAMUSCULAR; INTRAVENOUS at 06:13

## 2021-03-16 RX ADMIN — KETOROLAC TROMETHAMINE 30 MG: 30 INJECTION, SOLUTION INTRAMUSCULAR; INTRAVENOUS at 17:12

## 2021-03-16 RX ADMIN — PROPOFOL 20 MG: 10 INJECTION, EMULSION INTRAVENOUS at 01:20

## 2021-03-16 RX ADMIN — HYDROMORPHONE HYDROCHLORIDE: 2 INJECTION INTRAMUSCULAR; INTRAVENOUS; SUBCUTANEOUS at 02:23

## 2021-03-16 RX ADMIN — PROPOFOL 20 MG: 10 INJECTION, EMULSION INTRAVENOUS at 01:12

## 2021-03-16 RX ADMIN — ACETAMINOPHEN 1000 MG: 500 TABLET ORAL at 12:40

## 2021-03-16 RX ADMIN — SIMETHICONE 80 MG: 80 TABLET, CHEWABLE ORAL at 12:40

## 2021-03-16 RX ADMIN — ACETAMINOPHEN 1000 MG: 500 TABLET ORAL at 23:17

## 2021-03-16 RX ADMIN — SIMETHICONE 80 MG: 80 TABLET, CHEWABLE ORAL at 17:12

## 2021-03-16 RX ADMIN — BUPIVACAINE HYDROCHLORIDE 1.6 ML: 7.5 INJECTION, SOLUTION EPIDURAL; RETROBULBAR at 00:42

## 2021-03-16 RX ADMIN — ACETAMINOPHEN 1000 MG: 500 TABLET ORAL at 17:12

## 2021-03-16 RX ADMIN — AZITHROMYCIN 500 MG: 500 INJECTION, POWDER, LYOPHILIZED, FOR SOLUTION INTRAVENOUS at 01:20

## 2021-03-16 RX ADMIN — FENTANYL CITRATE 25 MCG: 50 INJECTION INTRAMUSCULAR; INTRAVENOUS at 01:13

## 2021-03-16 RX ADMIN — PROPOFOL 20 MG: 10 INJECTION, EMULSION INTRAVENOUS at 01:08

## 2021-03-16 RX ADMIN — Medication 20 MG: at 01:02

## 2021-03-16 RX ADMIN — SODIUM CHLORIDE, POTASSIUM CHLORIDE, SODIUM LACTATE AND CALCIUM CHLORIDE: 600; 310; 30; 20 INJECTION, SOLUTION INTRAVENOUS at 01:21

## 2021-03-16 RX ADMIN — SODIUM CHLORIDE, SODIUM LACTATE, POTASSIUM CHLORIDE, CALCIUM CHLORIDE 125 ML/HR: 600; 310; 30; 20 INJECTION, SOLUTION INTRAVENOUS at 07:01

## 2021-03-16 NOTE — ANESTHESIA PREPROCEDURE EVALUATION
Anesthesia Evaluation     NPO Solid Status: Waived due to emergency  NPO Liquid Status: Waived due to emergency           Airway   Mallampati: II  TM distance: >3 FB  Neck ROM: full  Dental    (+) poor dentition    Comment: Extremely poor dentition with multiple chipped and broken teeth and blackened teeth.    Pulmonary     breath sounds clear to auscultation  (+) a smoker Current Smoked day of surgery,   Cardiovascular     Rhythm: regular  Rate: abnormal    (-) murmur    PE comment: Tachycardic    Neuro/Psych  (+) seizures, headaches, psychiatric history Anxiety, Depression, Bipolar and PTSD,     GI/Hepatic/Renal/Endo      Musculoskeletal     Abdominal    Substance History   (+) drug use (methamphetamine use last states about 1-2 days ago)     OB/GYN    (+) Pregnant,     Comment: Previous c/s      Other                        Anesthesia Plan    ASA 4 - emergent     spinal   (Pt explained risks and benefits of anesthesia along with increased risk of cardiac complications due to drug use. Pt verbalized understanding.)    Anesthetic plan, all risks, benefits, and alternatives have been provided, discussed and informed consent has been obtained with: patient.

## 2021-03-16 NOTE — H&P
"HISTORY & PHYSICAL - Obstetrics  [facility]    Name: Zita Haas  MRN: [id number]  Location: L773/1  Date: 01/10/2021  CSN: [id number]      CHIEF COMPLAINT: \"I am in labor\"    HISTORY OF PRESENT ILLNESS  Zita Haas is a 25 y.o.  at 38w6d gestational age by patient reported ROCHELLE of 3/23/2021 which patient states is based off of the [facility] [place].  She states that she began mariposa this evening around 6:30PM.  She states that she did not get prenatal care because of the COVID-19 virus.  Did not report LOF.  Denies VB.  Reports good FM.    Patient denies any chest pain, palpitations, headaches, lightheadedness, shortness of breath, cough, nausea, vomiting, diarrhea, constipation, fever, or chills.    ROS  Review of Systems   Constitutional: Negative.    HENT: Negative.    Eyes: Negative.    Respiratory: Negative.    Cardiovascular: Negative.    Gastrointestinal: Positive for abdominal pain.   Endocrine: Negative.    Genitourinary: Negative.    Musculoskeletal: Negative.    Skin: Negative.    Neurological: Negative.    Hematological: Negative.    Psychiatric/Behavioral: Positive for dysphoric mood. Negative for self-injury. The patient is nervous/anxious.      PRENATAL LAB RESULTS  Prenatal labs reviewed.    External Prenatal Results     Pregnancy Outside Results - Transcribed From Office Records - See Scanned Records For Details     Test Value Date Time    Hgb  10.3 g/dL 18 0617    Hct  29.7 % 18 0617    ABO  B  18 0729    Rh  Positive  18 0729    Antibody Screen  Negative  18 0729    Glucose Fasting GTT       Glucose Tolerance Test 1 hour       Glucose Tolerance Test 3 hour       Gonorrhea (discrete)  Negative  18 0839    Chlamydia (discrete)  Negative  18 0839    RPR  Non-Reactive  18 0949    VDRL       Syphilis Antibody       Rubella  12.0 IU/mL 18 0949       Immune  18 0949    HBsAg  Negative  18 0949    Herpes " Simplex Virus PCR       Herpes Simplex VIrus Culture       HIV  Negative  18 0949    Hep C RNA Quant PCR       Hep C Antibody  Negative  18 0949    AFP       Group B Strep  Positive  18 1135    GBS Susceptibility to Clindamycin       GBS Susceptibility to Erythromycin       Fetal Fibronectin       Genetic Testing, Maternal Blood             Drug Screening     Test Value Date Time    Urine Drug Screen       Amphetamine Screen  Negative  18 0643    Barbiturate Screen  Negative  18 0643    Benzodiazepine Screen  Negative  18 0643    Methadone Screen  Negative  18 0643    Phencyclidine Screen       Opiates Screen  Negative  18 0643    THC Screen  Negative  18 0643    Cocaine Screen       Propoxyphene Screen       Buprenorphine Screen       Methamphetamine Screen       Oxycodone Screen  Negative  18 0643    Tricyclic Antidepressants Screen             Legend    ^: Historical                      PRENATAL RISK FACTORS  1.  Prior LTCS x2  2.  Anxiety/depression/bipolar disorder  3.  History of seizure disorder (drug-induced)  4.  Tobacco abuse    OB HISTORY  OB History    Para Term  AB Living   4 2 1 1 1 2   SAB TAB Ectopic Molar Multiple Live Births           0 2      # Outcome Date GA Lbr [place]/2nd Weight Sex Delivery Anes PTL Lv   4 Current            3 Term 18 39w0d  3204 g (7 lb 1 oz) F CS-LTranv Spinal N DARLING   2  16 36w0d  1701 g (3 lb 12 oz) M CS-LTranv Spinal N DARLING   1 AB              GYN HISTORY  Denies h/o sexually transmitted infections/pelvic inflammatory disease  Denies h/o gynecologic surgeries, including biopsies of the cervix    PAST MEDICAL HISTORY  Past Medical History:   Diagnosis Date   • Anxiety    • Bipolar disorder (CMS/HCC)    • Depression    • Migraine    • Post traumatic stress disorder (PTSD)    • Seizures (CMS/HCC)     remote h/o drug induced seizure   • Tobacco dependence syndrome      PAST SURGICAL  HISTORY  Past Surgical History:   Procedure Laterality Date   •  SECTION     •  SECTION Bilateral 2018    Procedure:  SECTION REPEAT;  Surgeon: Friday, Farzana LEE MD;  Location: [facility] LABOR DELIVERY;  Service: Obstetrics/Gynecology   • WISDOM TOOTH EXTRACTION       FAMILY HISTORY  Family History   Problem Relation Age of Onset   • Endometriosis Mother    • Miscarriages / Stillbirths Mother         2   • Other Mother           mother had guillian barre   • Depression Sister    • Anxiety disorder Sister    • Ulcers Maternal Grandmother    • Heart defect Maternal Grandfather      SOCIAL HISTORY  Social History     Socioeconomic History   • Marital status: Significant Other     Spouse name: Not on file   • Number of children: Not on file   • Years of education: Not on file   • Highest education level: Not on file   Tobacco Use   • Smoking status: Current Every Day Smoker     Packs/day: 0.50     Types: Cigarettes   • Smokeless tobacco: Never Used   Substance and Sexual Activity   • Alcohol use: No   • Drug use: No   • Sexual activity: Yes     Partners: Male     Birth control/protection: None     ALLERGIES  Allergies   Allergen Reactions   • Nicotine Polacrilex Rash     patch     HOME MEDICATIONS  None    PHYSICAL EXAM  /67 (BP Location: Right arm, Patient Position: Lying)   Pulse 113   Temp 98 °F (36.7 °C) (Oral)   Resp 20   SpO2 99%   General: No acute distress.  Well developed, well nourished.  Pleasant.  Heart: Regular rate and rhythm.  No murmurs, rubs, or gallops.  Lungs: Clear to auscultation bilaterally.  No wheezes, rales, or rhonchi.  Abdomen: Soft, nontender to palpation, enlarged by gravid uterus.  Extremities: Mild edema noted bilaterally.    NST Review  Indication: Pre-op   FHT: Baseline 145 bpm, moderate variability, pos accelerations, neg decelerations.  [place]: Irregular contractions every 2-5 minutes.  Impression: Reactive NST    SVE per RN: 2/80/0, no BBOW  palpated  Amnisure collected after cervical exam    IMPRESSION  Zita Haas is a 25 y.o.  at 38w6d presenting with term contractions and possible SROM based on exam.      PLAN  1.  IUP at 38w6d with possible labor  - Admit: Labor and Delivery  - Attending: Dr. Na Boss  - Condition: Stable  - Vitals: per protocol  - Activity: ad tuan  - Nursing: NST prior to surgery  - Diet: NPO  - IV fluids:  mL/hr  - Allergies: NKDA  - Labs: CBC, T&S, UDS  - GBS: unk.  Antibiotics not indicated.  - Ancef 2g prior to skin incision  - Patient consented for  section.  Reviewed risks and benefits to include injury to surrounding organs (bowel, bladder, ureters, blood vessels, nerves, baby), infection, bleeding (possibly requiring blood transfusion and/or hysterectomy), and maternal/fetal death.   - Zita Haas and I have discussed pain goals for this hospitalization after reviewing her current clinical condition, medical history and prior pain experiences.  The goal is to keep her pain level appropriate.  To help achieve this, schedule Tylenol and NSAIDS, +/- Duramorph or PCA.    This document has been electronically signed by Na Boss MD on March 15, 2021 23:42 CDT.    Addendum  SROM meconium.  Will proceed with RLTCS.  Anesthesia, NICU notified.    This document has been electronically signed by Na Boss MD on March 15, 2021 23:59 CDT.

## 2021-03-16 NOTE — ANESTHESIA POSTPROCEDURE EVALUATION
Patient: Zita Haas    Procedure Summary     Date: 21 Room / Location: [facility] LABOR DELIVERY  [facility] LABOR DELIVERY    Anesthesia Start: 32 Anesthesia Stop: 143    Procedure:  SECTION REPEAT (N/A Abdomen) Diagnosis:       Normal labor      (Normal labor [O80, Z37.9])    Surgeons: Na Boss MD Provider: Oscar Orellana CRNA    Anesthesia Type: spinal ASA Status: 4 - Emergent          Anesthesia Type: spinal    Vitals  Vitals Value Taken Time   /54 213   Temp     Pulse 111 21   Resp     SpO2 93 % 21   Vitals shown include unvalidated device data.        Post Anesthesia Care and Evaluation    Patient location during evaluation: PACU  Level of consciousness: sleepy but conscious and responsive to physical stimuli  Pain score: 0  Pain management: adequate  Airway patency: patent  Anesthetic complications: No anesthetic complications  PONV Status: none  Cardiovascular status: acceptable and hemodynamically stable  Respiratory status: acceptable and spontaneous ventilation  Hydration status: acceptable  Post Neuraxial Block status: No signs or symptoms of PDPH  Comments: Motor and sensory LE returning

## 2021-03-16 NOTE — ANESTHESIA PROCEDURE NOTES
Spinal Block    Pre-sedation assessment completed: 3/16/2021 12:32 AM    Patient reassessed immediately prior to procedure    Patient location during procedure: OR  Start Time: 3/16/2021 12:35 AM  Stop Time: 3/16/2021 12:42 AM  Indication:procedure for pain  Performed By  CRNA: Oscar Orellana CRNA  Preanesthetic Checklist  Completed: patient identified, IV checked, site marked, risks and benefits discussed, surgical consent, monitors and equipment checked, pre-op evaluation and timeout performed  Spinal Block Prep:  Patient Position:sitting  Sterile Tech:cap, gloves, sterile barriers and mask  Prep:Chloraprep  Patient Monitoring:EKG, continuous pulse oximetry and blood pressure monitoring  Spinal Block Procedure  Approach:midline  Guidance:landmark technique and palpation technique  Location:L4-L5  Needle Type:Pencan  Needle Gauge:24 G  Placement of Spinal needle event:cerebrospinal fluid aspirated  Paresthesia: no  Fluid Appearance:clear  Medications: bupivacaine PF (MARCAINE) 0.75 % injection, 1.6 mL  Med Administered at 3/16/2021 12:42 AM   Post Assessment  Patient Tolerance:patient tolerated the procedure well with no apparent complications  Complications no

## 2021-03-16 NOTE — OP NOTE
[facility]  Operative Report    Name: Zita Haas  MRN: [id number]  Date: 3/16/2021  CSN: [id number]      Location: [facility] LABOR DELIVERY    Service: Obstetrics    Pre-op Diagnosis:   1.  IUP at 39w0d  2.  Term labor  3.  Prior  section x2  4.  Drug abuse (methamphetamine, amphetamine)  5.  No prenatal care    Post-op Diagnosis: Same, in addition to delivered    Surgeon: Na Boss MD    Assistant: Felicitas Graham CST CSFA    Staff:  Circulator: Radha Block RN  Scrub Person: Porsha Grijalva RN    Anesthesia: Spinal without Duramorph    Anesthesia Staff:  CRNA: Oscar Orellana CRNA    Operation: Repeat low-transverse  section    Drains: Tinoco catheter, draining clear yellow urine throughout procedure    Complications: Bladder adhesions to the mid-uterus with uterine window present    Findings: Normal appearing abdomen.  Fascia minimally scarred.  Peritoneum thick and difficult to enter.  Upon entry into the abdomen, bladder adhesions noted to the mid-uterus.  Outpouching of the mid-lower uterine segment of the uterus noted, measuring 2 cm in diameter with meconium-stained mucus fluid noted.  Initially this was concerning for a uterine window but on further inspection this appeared to be an encased mucus.  Lower uterine segment thin.  Bilateral fallopian tubes and ovaries appeared normal.    Condition: Stable    Specimens/Disposition: Placenta and umbilical cord, to pathology    Estimated Blood Loss: 500 mL  Quantitative Blood Loss: pending  IV Fluids: 1500 mL crystalloid  Urine Output: 50 mL    Indications: Zita Haas, 25 y.o.,  at 39w0d presenting in term labor with no prenatal care.  She is dated by an ultrasound done at the [facility].  She declined TOLAC.    Description of Operation:  The patient was identified and the procedure verified as a  section delivery.  The patient was given spinal anesthesia.  Patient prepared and draped in normal  sterile fashion in dorsal supine position with a leftward tilt.  A transverse skin incision was made with the scalpel and carried down through the subcutaneous tissue to the fascia using the Bovie.  Fascial incision was made with the Bovie and extended transversely with Mcintosh scissors.  The superior aspect of the fascia was grasped with Kocher clamps and the rectus muscle was dissected off bluntly and sharply with Mcintosh scissors.  The inferior aspect of the fascia was then grasped with Kocher clamps and the rectus muscle and pyramidalis were dissected off bluntly and then sharply with Mcintosh scissors.  The rectus muscle was then  in the midline and the peritoneum was identified and entered bluntly.  The peritoneal incision was extended transversely.  Retractor were inserted.  The rectus muscles had to be cut with the Bovie to gain access to the abdomen.  The uterovesical peritoneal reflection was identified and incised transversely with Metzenbaum scissors.  This was covering a 2 cm collection of mucus over the lower uterine segment.  The bladder flap was bluntly freed from the lower uterine segment.  The bladder blade was adjusted.  A low transverse uterine incision was made with a scalpel below the level of the and the uterine incision was extended with upward traction.  Delivered from cephalic presentation was a live female  weighing 3230 grams with Apgar scores of 7 at one minute and 7 at five minutes.  Cord clamped and cut and infant with bulb suction were handed to awaiting staff.  Cord blood was obtained and sent.  The placenta was removed intact and appeared normal.  With removal, the endometrium was meconium-stained as well.  Uterus exteriorized and cleared of all clots and debris.  The uterus, tubes and ovaries appeared normal.  The uterine incision was closed with running locked sutures of 0-Vicryl.  The incision was imbricated with a second layer of 0-Vicryl.  There was a mucus-appearing  collection at the edge of the bladder.  To evaluate for bladder injury, the bladder was back-filled with >200 cc sterile milk; there was no spillage.  The bladder was then drained.  Posterior cul-de-sac was cleared.  Uterus was reinserted atraumatically.  Hemostasis was observed.  Bilateral paracolic gutters cleared.  Inspection of the abdomen and pelvis prior to abdominal wall closure revealed no evidence of retained instruments or sponges.  The fascia was then reapproximated with running sutures of 0-Vicryl.  Subcutaneous layer closed with 2-0 plain gut.  The skin was reapproximated with 3-0 Monocryl in subcuticular fashion.  Prineo dressing applied.    Estimated blood loss was 500 mL.  Sponge, needle and instrument counts were correct x2.  There were no intraoperative complications and patient tolerated the procedure well.  The patient was escorted to her room in stable condition.    The patient received Ancef 2g and azithromycin 500mg for antibiotic prophylaxis prior to the start of the procedure.  Due to such thick meconium being present, she will receive a 2nd dose of Ancef in 8 hours.    Felicitas Graham CST CSFA was responsible for performing the following activities: Retraction, Suction, Irrigation, Suturing, Closing, Placing Dressing and Delivery of Fetus and their skilled assistance was necessary for the success of this case.    This document has been electronically signed by Na oBss MD on March 16, 2021 01:42 CDT.

## 2021-03-16 NOTE — PLAN OF CARE
Goal Outcome Evaluation:  Plan of Care Reviewed With: patient, significant other  Progress: no change  Outcome Summary: Pt transfered over to MBU. PCA infusing. Pt resting at this time with significant other at bedside.

## 2021-03-16 NOTE — PLAN OF CARE
Problem: Adult Inpatient Plan of Care  Goal: Plan of Care Review  Outcome: Ongoing, Progressing  Flowsheets  Taken 3/16/2021 1820 by Edyta Curtis, RN  Progress: improving  Outcome Summary: pt to nicu via w/c, pain controlled with po meds, no void since rocha d/c'd, ff, incision dry and intact  Taken 3/16/2021 0504 by Linette Costello, RN  Plan of Care Reviewed With:   patient   significant other   Goal Outcome Evaluation:

## 2021-03-16 NOTE — L&D DELIVERY NOTE
[facility]   Delivery Note    Delivery     Delivery: , Low Transverse     YOB: 2022    Time of Birth:  Gestational Age 12:59 AM   39w0d     Anesthesia: Spinal     Delivering clinician: Na Boss    Forceps?   No   Vacuum? No    Shoulder dystocia present: No        Delivery narrative:  See operative report    Infant    Findings: female  infant     Infant observations: Weight: 3230 g (7 lb 1.9 oz)   Length: 19.882  in  Observations/Comments:        Apgars: 7  @ 1 minute /    7  @ 5 minutes   Infant Name: Baby Girl     Placenta, Cord, and Fluid    Placenta delivered  Expressed  at   3/16/2021  1:30 AM     Cord: 3 vessels  present.   Nuchal Cord?  no   Cord blood obtained: Yes    Cord gases obtained:  No    Cord gas results: Venous:  No results found for: PHCVEN    Arterial:  No results found for: PHCART     Repair    Episiotomy: None     No    Lacerations: No   Estimated Blood Loss: Est. Blood Loss (mL): 500 mL (21 0118)         Complications  No prenatal care, term labor    Disposition  Mother to Mother Baby/Postpartum in stable condition currently.  Baby to NBN in stable condition currently.    Na Boss MD  21  04:00 CDT

## 2021-03-17 VITALS
SYSTOLIC BLOOD PRESSURE: 146 MMHG | DIASTOLIC BLOOD PRESSURE: 74 MMHG | OXYGEN SATURATION: 100 % | RESPIRATION RATE: 20 BRPM | HEART RATE: 102 BPM | TEMPERATURE: 98 F

## 2021-03-17 PROBLEM — O09.299 HISTORY OF IUGR (INTRAUTERINE GROWTH RETARDATION) AND STILLBIRTH, CURRENTLY PREGNANT: Status: ACTIVE | Noted: 2021-03-17

## 2021-03-17 PROBLEM — O34.219 PREVIOUS CESAREAN DELIVERY AFFECTING PREGNANCY: Status: ACTIVE | Noted: 2018-05-09

## 2021-03-17 LAB
HCT VFR BLD AUTO: 27.2 % (ref 34–46.6)
HGB BLD-MCNC: 8.6 G/DL (ref 12–15.9)

## 2021-03-17 PROCEDURE — 85018 HEMOGLOBIN: CPT | Performed by: OBSTETRICS & GYNECOLOGY

## 2021-03-17 PROCEDURE — 85014 HEMATOCRIT: CPT | Performed by: OBSTETRICS & GYNECOLOGY

## 2021-03-17 PROCEDURE — 99238 HOSP IP/OBS DSCHRG MGMT 30/<: CPT | Performed by: OBSTETRICS & GYNECOLOGY

## 2021-03-17 RX ORDER — OXYCODONE HYDROCHLORIDE 5 MG/1
5 TABLET ORAL EVERY 4 HOURS PRN
Qty: 15 TABLET | Refills: 0 | Status: SHIPPED | OUTPATIENT
Start: 2021-03-17 | End: 2021-03-23

## 2021-03-17 RX ORDER — IBUPROFEN 800 MG/1
800 TABLET ORAL EVERY 8 HOURS SCHEDULED
Qty: 60 TABLET | Refills: 0 | OUTPATIENT
Start: 2021-03-17 | End: 2021-12-07

## 2021-03-17 RX ORDER — PSEUDOEPHEDRINE HCL 30 MG
100 TABLET ORAL 2 TIMES DAILY PRN
Qty: 60 CAPSULE | Refills: 0 | OUTPATIENT
Start: 2021-03-17 | End: 2021-12-07

## 2021-03-17 RX ORDER — ACETAMINOPHEN 500 MG
1000 TABLET ORAL EVERY 6 HOURS
Qty: 60 TABLET | Refills: 1 | OUTPATIENT
Start: 2021-03-17 | End: 2021-12-07

## 2021-03-17 RX ADMIN — IBUPROFEN 800 MG: 800 TABLET, FILM COATED ORAL at 02:23

## 2021-03-17 RX ADMIN — ACETAMINOPHEN 1000 MG: 500 TABLET ORAL at 05:51

## 2021-03-17 RX ADMIN — PRENATAL VIT W/ FE FUMARATE-FA TAB 27-0.8 MG 1 TABLET: 27-0.8 TAB at 08:46

## 2021-03-17 RX ADMIN — SIMETHICONE 80 MG: 80 TABLET, CHEWABLE ORAL at 08:46

## 2021-03-17 NOTE — DISCHARGE SUMMARY
[facility]  Discharge Summary  Patient Name: Zita Haas  : 1995  MRN: [id number]  [facility]: [id number]    Discharge Summary    Date of Admission: 3/15/2021   Date of Discharge: 3/17/2021    Principle Discharge Dx: Active Hospital Problems    Diagnosis  POA   • **Normal labor [O80, Z37.9]  Not Applicable   • History of IUGR (intrauterine growth retardation) and stillbirth, currently pregnant [O09.299]  Not Applicable   • Single delivery by  section [O82]  No   • History of  delivery, currently pregnant in third trimester [O09.893]  Not Applicable   • Previous  delivery affecting pregnancy [O34.219]  Yes      Procedures Performed: Procedure(s):   SECTION REPEAT   Brief History: Patient is a 25 y.o. now  who presented to labor and delivery at 39w0d with term labor.   Hospital Course: Patient presented at 39w0d with term labor.  She had a RLTCS.  Her postoperative course was unremarkable.  On POD #1 she expressed the desire for discharge.  She had passed gas and was urinating normally.  She was eating a regular diet without difficulty.  She was ambulating well.  Her incision was clean, dry and intact.  Discharge instructions were given.  All questions were answered   Condition:  Discharge Activity:  Discharge Diet: Stable  Activity Instructions     Bathing Restrictions      Type of Restriction: Bathing    Bathing Restrictions: Other    Explain Bathing Restrictions: No soaking in bathtub for 4 weeks/ Showers are fine.    Driving Restrictions      Type of Restriction: Driving    Driving Restrictions: No Driving (Time Limited)    Length: Other    Indicate Length of Restriction: No driving for 1 week or while on narcotic pain medications. You must be able to quickly press on the brake before driving. Riding is car is fine.    Lifting Restrictions      Type of Restriction: Lifting    Lifting Restrictions: Other    Explain Lifing Restrictions: No lifting more  than infant and baby carrier together for 6 weeks.    Pelvic Rest      Nothing in the vagina for 6 weeks to include tampons, douching, or sexual intercourse.    Sexual Activity Restrictions      Type of Restriction: Sex    Explain Sexual Activity Restrictions: No sexual intercourse, tampon use, or douching for at least 6 weeks        Regular   Discharge Medications:    Your medication list      START taking these medications      Instructions Last Dose Given Next Dose Due   acetaminophen 500 MG tablet  Commonly known as: TYLENOL      Take 2 tablets by mouth Every 6 (Six) Hours.       docusate sodium 100 MG capsule      Take 1 capsule by mouth 2 (Two) Times a Day As Needed for Constipation.       ibuprofen 800 MG tablet  Commonly known as: ADVIL,MOTRIN      Take 1 tablet by mouth Every 8 (Eight) Hours.       oxyCODONE 5 MG immediate release tablet  Commonly known as: ROXICODONE      Take 1 tablet by mouth Every 4 (Four) Hours As Needed for Severe Pain  for up to 6 days.             Where to Get Your Medications      These medications were sent to [facility]/pharmacy #[id number] - [address] [phone] [place] [phone] [address] [address]    Phone: [phone] ·   acetaminophen 500 MG tablet  · docusate sodium 100 MG capsule  · ibuprofen 800 MG tablet  · oxyCODONE 5 MG immediate release tablet        Discharge Disposition: Home   Follow-up: No future appointments.  1 week PP visit  6 week PP visit     <30 minutes spent with the patient.    This document has been electronically signed by Na Boss MD on March 17, 2021 09:45 CDT.

## 2021-03-17 NOTE — PLAN OF CARE
Goal Outcome Evaluation:  Plan of Care Reviewed With: patient     Outcome Summary: vss, ambulating to visit baby, denie pain, voiding, passing gas, wishes to go.

## 2021-03-17 NOTE — PLAN OF CARE
Problem: Adult Inpatient Plan of Care  Goal: Plan of Care Review  Outcome: Ongoing, Progressing  Flowsheets  Taken 3/17/2021 0401 by Linette Costello, RN  Outcome Summary:   Pt ambulated to NICU multiple times during the night. Voided since rocha removal. Complaints of pain   prn pain medication administered.  Taken 3/16/2021 1820 by Edyta Curtis RN  Progress: improving  Taken 3/16/2021 0504 by Linette Costello, RN  Plan of Care Reviewed With:   patient   significant other   Goal Outcome Evaluation:  Plan of Care Reviewed With: patient, significant other  Progress: no change  Outcome Summary: Pt ambulated to NICU multiple times during the night. Voided since rocha removal. Complaints of pain; prn pain medication administered.

## 2021-03-17 NOTE — PROGRESS NOTES
[facility]  Progress Note - Obstetrics    Name: Zita Haas  MRN: [id number]  Location:   Date: 3/17/2021  CSN: [id number]    POD #1 s/p RLTCS secondary to term labor, SROM at 39w0d,  mL    Patient seen and examined. Up and walking to NICU multiple times through the night per nursing. No complaints.  Pain well controlled. Tolerating diet.  No nausea or vomiting. No subjective fevers/chills.  No headache, dizziness, chest pain, or shortness of breath.  Passing flatus, no bowel movement.  Up and out of bed and ambulating.  Voiding without difficulty. Lochia minimal.  Bottle-feeding.    PHYSICAL EXAM  /79 (BP Location: Right arm, Patient Position: Sitting)   Pulse 92   Temp 97.9 °F (36.6 °C) (Oral)   Resp 18   SpO2 98%   Breastfeeding No   General: No apparent distress.  Alert and cooperative.  Pleasant.  Heart: Regular rate and rhythm.  No murmurs, rubs, or gallops.  Lungs: Clear to auscultation bilaterally.  No wheezes, rales, or rhonchi.  Abdomen: Soft.  Mildly tender to palpation.  No rebound tenderness or guarding.  +BS.  Dressing:Incision: Clean, dry, and intact.  No erythema or warmth.   Extremities: +2/4 posterior tibial.  No pitting edema in lower extremities.  No calf tenderness.  Uterus: Uterine fundus firm, non-tender and below umbilicus.      Intake/Output Summary (Last 24 hours) at 3/17/2021 0705  Last data filed at 3/16/2021 1351  Gross per 24 hour   Intake --   Output 1150 ml   Net -1150 ml     LABS  Hgb: 9.7 pre-op-> 8.6 post-op    IMPRESSION  Zita Haas is a 25 y.o.  POD #1 s/p LTCS secondary to SROM.    PLAN  1.  Following surgery- doing well overall  -  Ferrous sulfate for mild iron-deficiency anemia  - Encourage OOB and ambulation  - Encourage incentive spirometry  - Diet: Pregnancy/breastfeeding  - Bottle feeding  - Discharge patient home today.  1 week, 6 week follow-up for postpartum.    This document has been electronically signed by  "Na Abdi, Medical Student on 2021 07:05 CDT.    Attending Addendum  I have seen and evaluated the patient.  I have discussed the case with the student.  I have reviewed the notes, assessment and plan, and/or procedures performed by the student.  I concur with the student's documentation.    Patient seen and examined.  Doing well.  States that she wants to go home as \" is coming at 2PM and she needs to clean up.\"  Voiding spontaneously.  Passing flatus.  Lochia minimal.  Bottlefeeding.    /74 (BP Location: Right arm, Patient Position: Sitting)   Pulse 102   Temp 98 °F (36.7 °C) (Oral)   Resp 20   SpO2 100%   Breastfeeding No   Gen: NAD, AAO x3  Abd: Soft, NTND, uterus nontender and FFBU, incision c/d/i    Hgb: 9.7 > 8.6    A/P: Zita Haas is a 25 y.o.  POD #1 s/p RLTCS at 39w0d secondary to term labor with no PNC.  Recovering appropriately; stable for discharge.  - Contraception: Undecided  - Bottlefeeding  - D/c to home today per patient request    This document has been electronically signed by Na Boss MD on 2021 09:41 CDT.  "

## 2021-03-18 LAB — AMPHET+METHAMPHET UR QL: POSITIVE

## 2021-03-18 NOTE — PAYOR COMM NOTE
Kathrin Nagy  [facility]  P: [phone]  F: [phone]  [facility]#[id number]    Karson Haas (25 y.o. Female)     Date of Birth Social Security Number Address Home Phone MRN    1995  [address] [phone] [id number]    Latter day Marital Status          Jewish Significant Other       Admission Date Admission Type Admitting Provider Attending Provider Department, Room/Bed    3/15/21 Elective Na Boss MD  [facility] MOTHER BABY, M754/1    Discharge Date Discharge Disposition Discharge Destination        3/17/2021 Home or Self Care              Attending Provider: (none)   Allergies: Nicotine Polacrilex    Isolation: None   Infection: None   Code Status: Prior    Ht: --   Wt: --    Admission Cmt: None   Principal Problem: Normal labor [O80,Z37.9]                 Active Insurance as of 3/15/2021     Primary Coverage     Payor Plan Insurance Group Employer/Plan Group    [place] [id number]     Payor Plan Address Payor Plan Phone Number Payor Plan Fax Number Effective Dates    [employer] [address] [address] [phone]  2021 - None Entered    [facility] [id number]       Subscriber Name Subscriber Birth Date Member ID       KARSON HAAS 1995 [id number]                 Emergency Contacts      (Rel.) Home Phone Work Phone Mobile Phone    YARELYDAVID (Significant Other) [phone] -- --               Discharge Summary      Na Boss MD at 21 0945          [facility]  Discharge Summary  Patient Name: Karson Haas  : 1995  MRN: [id number]  CSN: [id number]    Discharge Summary    Date of Admission: 3/15/2021   Date of Discharge: 3/17/2021    Principle Discharge Dx: Active Hospital Problems    Diagnosis  POA   • **Normal labor [O80, Z37.9]  Not Applicable   • History of IUGR (intrauterine growth retardation) and  stillbirth, currently pregnant [O09.299]  Not Applicable   • Single delivery by  section [O82]  No   • History of  delivery, currently pregnant in third trimester [O09.893]  Not Applicable   • Previous  delivery affecting pregnancy [O34.219]  Yes      Procedures Performed: Procedure(s):   SECTION REPEAT   Brief History: Patient is a 25 y.o. now  who presented to labor and delivery at 39w0d with term labor.   Hospital Course: Patient presented at 39w0d with term labor.  She had a RLTCS.  Her postoperative course was unremarkable.  On POD #1 she expressed the desire for discharge.  She had passed gas and was urinating normally.  She was eating a regular diet without difficulty.  She was ambulating well.  Her incision was clean, dry and intact.  Discharge instructions were given.  All questions were answered   Condition:  Discharge Activity:  Discharge Diet: Stable  Activity Instructions     Bathing Restrictions      Type of Restriction: Bathing    Bathing Restrictions: Other    Explain Bathing Restrictions: No soaking in bathtub for 4 weeks/ Showers are fine.    Driving Restrictions      Type of Restriction: Driving    Driving Restrictions: No Driving (Time Limited)    Length: Other    Indicate Length of Restriction: No driving for 1 week or while on narcotic pain medications. You must be able to quickly press on the brake before driving. Riding is car is fine.    Lifting Restrictions      Type of Restriction: Lifting    Lifting Restrictions: Other    Explain Lifing Restrictions: No lifting more than infant and baby carrier together for 6 weeks.    Pelvic Rest      Nothing in the vagina for 6 weeks to include tampons, douching, or sexual intercourse.    Sexual Activity Restrictions      Type of Restriction: Sex    Explain Sexual Activity Restrictions: No sexual intercourse, tampon use, or douching for at least 6 weeks        Regular   Discharge Medications:    Your medication list       START taking these medications      Instructions Last Dose Given Next Dose Due   acetaminophen 500 MG tablet  Commonly known as: TYLENOL      Take 2 tablets by mouth Every 6 (Six) Hours.       docusate sodium 100 MG capsule      Take 1 capsule by mouth 2 (Two) Times a Day As Needed for Constipation.       ibuprofen 800 MG tablet  Commonly known as: ADVIL,MOTRIN      Take 1 tablet by mouth Every 8 (Eight) Hours.       oxyCODONE 5 MG immediate release tablet  Commonly known as: ROXICODONE      Take 1 tablet by mouth Every 4 (Four) Hours As Needed for Severe Pain  for up to 6 days.             Where to Get Your Medications      These medications were sent to [facility]/pharmacy #[id number] - [place], [place] - [address] [place] [address] - [phone]  - [phone] [address] [address]    Phone: [phone] ·   acetaminophen 500 MG tablet  · docusate sodium 100 MG capsule  · ibuprofen 800 MG tablet  · oxyCODONE 5 MG immediate release tablet        Discharge Disposition: Home   Follow-up: No future appointments.  1 week PP visit  6 week PP visit     <30 minutes spent with the patient.    This document has been electronically signed by Terry Amaral MD on March 17, 2021 09:45 CDT.    Electronically signed by Terry Amaral MD at 03/17/21 0946       Discharge Order (From admission, onward)     Start     Ordered    03/17/21 0930  Discharge patient  Once     Expected Discharge Date: 03/17/21    Discharge Disposition: Home or Self Care    Physician of Record for Attribution - Please select from Treatment Team: TERRY AMARAL [[id number]]    Review needed by CMO to determine Physician of Record: No       Question Answer Comment   Physician of Record for Attribution - Please select from Treatment Team TERRY AMARAL    Review needed by CMO to determine Physician of Record No        03/17/21 0930

## 2021-03-19 LAB
LAB AP CASE REPORT: NORMAL
PATH REPORT.FINAL DX SPEC: NORMAL

## 2022-11-26 ENCOUNTER — TELEMEDICINE (OUTPATIENT)
Dept: FAMILY MEDICINE CLINIC | Facility: TELEHEALTH | Age: 27
End: 2022-11-26

## 2022-11-26 DIAGNOSIS — J20.9 ACUTE BRONCHITIS, UNSPECIFIED ORGANISM: ICD-10-CM

## 2022-11-26 DIAGNOSIS — J06.9 ACUTE URI: Primary | ICD-10-CM

## 2022-11-26 PROCEDURE — 99213 OFFICE O/P EST LOW 20 MIN: CPT | Performed by: NURSE PRACTITIONER

## 2022-11-26 RX ORDER — GUAIFENESIN 200 MG/10ML
200 LIQUID ORAL 3 TIMES DAILY PRN
Qty: 118 ML | Refills: 0 | Status: ON HOLD | OUTPATIENT
Start: 2022-11-26 | End: 2023-01-05

## 2022-11-26 RX ORDER — AZITHROMYCIN 250 MG/1
TABLET, FILM COATED ORAL
Qty: 6 TABLET | Refills: 0 | Status: ON HOLD | OUTPATIENT
Start: 2022-11-26 | End: 2023-01-05

## 2022-11-27 NOTE — PROGRESS NOTES
You have chosen to receive care through a telehealth visit.  Do you consent to use a video/audio connection for your medical care today? Yes     CHIEF COMPLAINT  Chief Complaint   Patient presents with   • Cough   • Sore Throat         HPI  Zita Haas is a 27 y.o. female  presents with complaint of cough and sore throat x1 week.  She states that she is 7 months pregnant also.    Review of Systems   HENT: Positive for sore throat.    Respiratory: Positive for cough.    All other systems reviewed and are negative.      Past Medical History:   Diagnosis Date   • Anxiety    • Bipolar disorder (HCC)    • Depression    • Migraine    • Post traumatic stress disorder (PTSD)    • Seizures (HCC)     remote h/o drug induced seizure   • Tobacco dependence syndrome        Family History   Problem Relation Age of Onset   • Endometriosis Mother    • Miscarriages / Stillbirths Mother         2   • Other Mother           mother had guillian barre   • Depression Sister    • Anxiety disorder Sister    • Ulcers Maternal Grandmother    • Heart defect Maternal Grandfather        Social History     Socioeconomic History   • Marital status: Significant Other   Tobacco Use   • Smoking status: Every Day     Packs/day: 0.50     Types: Cigarettes   • Smokeless tobacco: Never   Substance and Sexual Activity   • Alcohol use: No   • Drug use: No   • Sexual activity: Yes     Partners: Male     Birth control/protection: None       Zita Haas  reports that she has been smoking cigarettes. She has been smoking an average of .5 packs per day. She has never used smokeless tobacco.. I have educated her on the risk of diseases from using tobacco products such as cancer, COPD and heart disease.     I advised her to quit and she is not willing to quit.    I spent 3  minutes counseling the patient.              There were no vitals taken for this visit.    PHYSICAL EXAM  Physical Exam   Constitutional: She appears well-developed and  well-nourished.   HENT:   Head: Normocephalic.   Eyes: Pupils are equal, round, and reactive to light.   Pulmonary/Chest: Effort normal.   Musculoskeletal: Normal range of motion.   Neurological: She is alert.   Psychiatric: She has a normal mood and affect.       Results for orders placed or performed during the hospital encounter of 03/15/21   Chlamydia trachomatis, Neisseria gonorrhoeae, Trichomonas vaginalis, PCR - Urine, Urine, Clean Catch    Specimen: Urine, Clean Catch   Result Value Ref Range    Chlamydia DNA by PCR Negative Negative    Neisseria gonorrhoeae by PCR Negative Negative    Trichomonas vaginalis PCR Negative Negative, Invalid   Urine Drug Screen - Urine, Clean Catch    Specimen: Urine, Clean Catch   Result Value Ref Range    THC, Screen, Urine Negative Negative    Phencyclidine (PCP), Urine Negative Negative    Cocaine Screen, Urine Negative Negative    Methamphetamine, Ur Positive (A) Negative    Opiate Screen Negative Negative    Amphetamine Screen, Urine Positive (A) Negative    Benzodiazepine Screen, Urine Negative Negative    Tricyclic Antidepressants Screen Negative Negative    Methadone Screen, Urine Negative Negative    Barbiturates Screen, Urine Negative Negative    Oxycodone Screen, Urine Negative Negative    Propoxyphene Screen Negative Negative    Buprenorphine, Screen, Urine Negative Negative   RPR    Specimen: Blood   Result Value Ref Range    RPR Non-Reactive Non-Reactive   CBC Auto Differential    Specimen: Blood   Result Value Ref Range    WBC 15.32 (H) 3.40 - 10.80 10*3/mm3    RBC 3.74 (L) 3.77 - 5.28 10*6/mm3    Hemoglobin 9.7 (L) 12.0 - 15.9 g/dL    Hematocrit 29.7 (L) 34.0 - 46.6 %    MCV 79.4 79.0 - 97.0 fL    MCH 25.9 (L) 26.6 - 33.0 pg    MCHC 32.7 31.5 - 35.7 g/dL    RDW 16.1 (H) 12.3 - 15.4 %    RDW-SD 45.6 37.0 - 54.0 fl    MPV 10.9 6.0 - 12.0 fL    Platelets 230 140 - 450 10*3/mm3    Neutrophil % 76.6 (H) 42.7 - 76.0 %    Lymphocyte % 13.4 (L) 19.6 - 45.3 %    Monocyte  % 7.4 5.0 - 12.0 %    Eosinophil % 1.5 0.3 - 6.2 %    Basophil % 0.5 0.0 - 1.5 %    Immature Grans % 0.6 (H) 0.0 - 0.5 %    Neutrophils, Absolute 11.74 (H) 1.70 - 7.00 10*3/mm3    Lymphocytes, Absolute 2.06 0.70 - 3.10 10*3/mm3    Monocytes, Absolute 1.13 (H) 0.10 - 0.90 10*3/mm3    Eosinophils, Absolute 0.23 0.00 - 0.40 10*3/mm3    Basophils, Absolute 0.07 0.00 - 0.20 10*3/mm3    Immature Grans, Absolute 0.09 (H) 0.00 - 0.05 10*3/mm3    nRBC 0.0 0.0 - 0.2 /100 WBC   Hepatitis B Surface Antigen    Specimen: Blood   Result Value Ref Range    Hepatitis B Surface Ag Non-Reactive Non-Reactive   Rubella Antibody, IgG    Specimen: Blood   Result Value Ref Range    Rubella Antibodies, IgG Equivocal    HIV-1 / O / 2 Ag / Antibody 4th Generation    Specimen: Blood   Result Value Ref Range    HIV-1/ HIV-2 Non-Reactive Non-Reactive   Hepatitis C Antibody    Specimen: Blood   Result Value Ref Range    Hepatitis C Ab Non-Reactive Non-Reactive   PAMG-1, Rapid Assay For ROM - Amniotic Fluid, Amniotic Sac    Specimen: Amniotic Sac; Amniotic Fluid   Result Value Ref Range    Rupture of Membranes Negative Negative   Amphetamine Confirmation, Ur - Urine, Clean Catch    Specimen: Urine, Clean Catch   Result Value Ref Range    Amphetamine, Urine Positive (A)    Hemoglobin & Hematocrit, Blood    Specimen: Blood   Result Value Ref Range    Hemoglobin 8.6 (L) 12.0 - 15.9 g/dL    Hematocrit 27.2 (L) 34.0 - 46.6 %   OB Panel Type & Screen    Specimen: Blood   Result Value Ref Range    ABO Type B     RH type Positive     Antibody Screen Negative    PREVIOUS HISTORY    Specimen: Blood   Result Value Ref Range    Previous History Previous Record on File    Tissue Pathology Exam    Specimen: Placenta; Tissue   Result Value Ref Range    Case Report       Surgical Pathology Report                         Case: [id number]                                  Authorizing Provider:  Na Boss         Collected:           03/16/2021 01:17 AM                                  MD Toshia                                                                Ordering Location:     [facility]             Received:            03/16/2021 06:48 AM                                 [place] LABOR                                                                                  DELIVERY                                                                     Pathologist:           Alexey Leyva MD                                                            Specimen:    Placenta                                                                                   Final Diagnosis       SEE SCANNED REPORT           Diagnoses and all orders for this visit:    1. Acute URI (Primary)    2. Acute bronchitis, unspecified organism    Other orders  -     azithromycin (Zithromax Z-Michele) 250 MG tablet; Take 2 tablets by mouth on day 1, then 1 tablet daily on days 2-5  Dispense: 6 tablet; Refill: 0  -     guaifenesin (ROBITUSSIN) 100 MG/5ML liquid; Take 10 mL by mouth 3 (Three) Times a Day As Needed for Cough.  Dispense: 118 mL; Refill: 0          FOLLOW-UP  As discussed during visit with PCP/[facility] if no improvement or Urgent Care/Emergency Department if worsening of symptoms    Patient verbalizes understanding of medication dosage, comfort measures, instructions for treatment and follow-up.    Kristie Aparicio, APRN  11/26/2022  21:14 EST    The use of a video visit has been reviewed with the patient and verbal informed consent has been obtained. Myself and Zita Haas participated in this visit. The patient is located in [address] [place] [address].    I am located in [place], [place]. Mychart and Zoom were utilized. I spent 20 minutes in the patient's chart for this visit.

## 2023-01-05 ENCOUNTER — ANESTHESIA (OUTPATIENT)
Dept: LABOR AND DELIVERY | Facility: HOSPITAL | Age: 28
End: 2023-01-05
Payer: MEDICAID

## 2023-01-05 ENCOUNTER — HOSPITAL ENCOUNTER (INPATIENT)
Facility: HOSPITAL | Age: 28
LOS: 1 days | Discharge: HOME | End: 2023-01-05
Attending: OBSTETRICS & GYNECOLOGY | Admitting: OBSTETRICS & GYNECOLOGY
Payer: MEDICAID

## 2023-01-05 ENCOUNTER — ANESTHESIA EVENT (OUTPATIENT)
Dept: LABOR AND DELIVERY | Facility: HOSPITAL | Age: 28
End: 2023-01-05
Payer: MEDICAID

## 2023-01-05 VITALS
BODY MASS INDEX: 25.8 KG/M2 | HEART RATE: 104 BPM | DIASTOLIC BLOOD PRESSURE: 79 MMHG | OXYGEN SATURATION: 100 % | TEMPERATURE: 97.8 F | SYSTOLIC BLOOD PRESSURE: 131 MMHG | RESPIRATION RATE: 20 BRPM | WEIGHT: 185 LBS

## 2023-01-05 DIAGNOSIS — O34.219 PREVIOUS CESAREAN DELIVERY AFFECTING PREGNANCY: ICD-10-CM

## 2023-01-05 DIAGNOSIS — O09.33 NO PRENATAL CARE IN CURRENT PREGNANCY IN THIRD TRIMESTER: ICD-10-CM

## 2023-01-05 DIAGNOSIS — O09.299 HISTORY OF INTRAUTERINE GROWTH RESTRICTION AND STILLBIRTH, CURRENTLY PREGNANT: Primary | ICD-10-CM

## 2023-01-05 PROBLEM — Z90.79 STATUS POST BILATERAL SALPINGECTOMY: Status: ACTIVE | Noted: 2023-01-05

## 2023-01-05 LAB
ABO GROUP BLD: NORMAL
AMPHET+METHAMPHET UR QL: POSITIVE
AMPHETAMINES UR QL: POSITIVE
BACTERIA UR QL AUTO: ABNORMAL /HPF
BARBITURATES UR QL SCN: NEGATIVE
BASOPHILS # BLD AUTO: 0.06 10*3/MM3 (ref 0–0.2)
BASOPHILS NFR BLD AUTO: 0.6 % (ref 0–1.5)
BENZODIAZ UR QL SCN: NEGATIVE
BILIRUB UR QL STRIP: NEGATIVE
BLD GP AB SCN SERPL QL: NEGATIVE
BLD GP AB SCN SERPL QL: NEGATIVE
BUPRENORPHINE SERPL-MCNC: NEGATIVE NG/ML
CANNABINOIDS SERPL QL: NEGATIVE
CLARITY UR: ABNORMAL
COCAINE UR QL: NEGATIVE
COLOR UR: YELLOW
DEPRECATED RDW RBC AUTO: 46.5 FL (ref 37–54)
EOSINOPHIL # BLD AUTO: 0.16 10*3/MM3 (ref 0–0.4)
EOSINOPHIL NFR BLD AUTO: 1.6 % (ref 0.3–6.2)
ERYTHROCYTE [DISTWIDTH] IN BLOOD BY AUTOMATED COUNT: 16.7 % (ref 12.3–15.4)
GLUCOSE UR STRIP-MCNC: NEGATIVE MG/DL
HBV SURFACE AG SERPL QL IA: NORMAL
HCT VFR BLD AUTO: 32 % (ref 34–46.6)
HCV AB SER DONR QL: NORMAL
HGB BLD-MCNC: 9.6 G/DL (ref 12–15.9)
HGB UR QL STRIP.AUTO: ABNORMAL
HIV1+2 AB SER QL: NORMAL
HOLD SPECIMEN: NORMAL
HOLD SPECIMEN: NORMAL
HYALINE CASTS UR QL AUTO: ABNORMAL /LPF
IMM GRANULOCYTES # BLD AUTO: 0.04 10*3/MM3 (ref 0–0.05)
IMM GRANULOCYTES NFR BLD AUTO: 0.4 % (ref 0–0.5)
KETONES UR QL STRIP: NEGATIVE
LEUKOCYTE ESTERASE UR QL STRIP.AUTO: ABNORMAL
LYMPHOCYTES # BLD AUTO: 2.4 10*3/MM3 (ref 0.7–3.1)
LYMPHOCYTES NFR BLD AUTO: 24.2 % (ref 19.6–45.3)
Lab: NORMAL
MCH RBC QN AUTO: 23.3 PG (ref 26.6–33)
MCHC RBC AUTO-ENTMCNC: 30 G/DL (ref 31.5–35.7)
MCV RBC AUTO: 77.7 FL (ref 79–97)
METHADONE UR QL SCN: NEGATIVE
MONOCYTES # BLD AUTO: 0.59 10*3/MM3 (ref 0.1–0.9)
MONOCYTES NFR BLD AUTO: 6 % (ref 5–12)
NEUTROPHILS NFR BLD AUTO: 6.66 10*3/MM3 (ref 1.7–7)
NEUTROPHILS NFR BLD AUTO: 67.2 % (ref 42.7–76)
NITRITE UR QL STRIP: NEGATIVE
NRBC BLD AUTO-RTO: 0.2 /100 WBC (ref 0–0.2)
OPIATES UR QL: NEGATIVE
OXYCODONE UR QL SCN: NEGATIVE
PCP UR QL SCN: NEGATIVE
PH UR STRIP.AUTO: 7 [PH] (ref 5–9)
PLATELET # BLD AUTO: 197 10*3/MM3 (ref 140–450)
PMV BLD AUTO: 11.5 FL (ref 6–12)
PROPOXYPH UR QL: NEGATIVE
PROT UR QL STRIP: ABNORMAL
RBC # BLD AUTO: 4.12 10*6/MM3 (ref 3.77–5.28)
RBC # UR STRIP: ABNORMAL /HPF
REF LAB TEST METHOD: ABNORMAL
RENAL EPI CELLS #/AREA URNS HPF: ABNORMAL /HPF
RH BLD: POSITIVE
RPR SER QL: NORMAL
SP GR UR STRIP: 1.02 (ref 1–1.03)
SQUAMOUS #/AREA URNS HPF: ABNORMAL /HPF
T&S EXPIRATION DATE: NORMAL
TRANS CELLS #/AREA URNS HPF: ABNORMAL /HPF
TRICYCLICS UR QL SCN: NEGATIVE
UROBILINOGEN UR QL STRIP: ABNORMAL
WBC # UR STRIP: ABNORMAL /HPF
WBC NRBC COR # BLD: 9.91 10*3/MM3 (ref 3.4–10.8)
YEAST URNS QL MICRO: ABNORMAL /HPF

## 2023-01-05 PROCEDURE — 0UT70ZZ RESECTION OF BILATERAL FALLOPIAN TUBES, OPEN APPROACH: ICD-10-PCS | Performed by: OBSTETRICS & GYNECOLOGY

## 2023-01-05 PROCEDURE — 81001 URINALYSIS AUTO W/SCOPE: CPT | Performed by: OBSTETRICS & GYNECOLOGY

## 2023-01-05 PROCEDURE — 25010000002 FENTANYL CITRATE (PF) 50 MCG/ML SOLUTION: Performed by: NURSE ANESTHETIST, CERTIFIED REGISTERED

## 2023-01-05 PROCEDURE — 80306 DRUG TEST PRSMV INSTRMNT: CPT | Performed by: OBSTETRICS & GYNECOLOGY

## 2023-01-05 PROCEDURE — 59515 CESAREAN DELIVERY: CPT | Performed by: OBSTETRICS & GYNECOLOGY

## 2023-01-05 PROCEDURE — 88302 TISSUE EXAM BY PATHOLOGIST: CPT

## 2023-01-05 PROCEDURE — 25010000002 KETOROLAC TROMETHAMINE PER 15 MG: Performed by: OBSTETRICS & GYNECOLOGY

## 2023-01-05 PROCEDURE — 25010000002 FENTANYL CITRATE (PF) 100 MCG/2ML SOLUTION: Performed by: NURSE ANESTHETIST, CERTIFIED REGISTERED

## 2023-01-05 PROCEDURE — 87661 TRICHOMONAS VAGINALIS AMPLIF: CPT | Performed by: OBSTETRICS & GYNECOLOGY

## 2023-01-05 PROCEDURE — 86803 HEPATITIS C AB TEST: CPT | Performed by: OBSTETRICS & GYNECOLOGY

## 2023-01-05 PROCEDURE — 59514 CESAREAN DELIVERY ONLY: CPT | Performed by: SPECIALIST/TECHNOLOGIST, OTHER

## 2023-01-05 PROCEDURE — S0260 H&P FOR SURGERY: HCPCS | Performed by: OBSTETRICS & GYNECOLOGY

## 2023-01-05 PROCEDURE — G0480 DRUG TEST DEF 1-7 CLASSES: HCPCS | Performed by: OBSTETRICS & GYNECOLOGY

## 2023-01-05 PROCEDURE — 25010000002 ONDANSETRON PER 1 MG: Performed by: NURSE ANESTHETIST, CERTIFIED REGISTERED

## 2023-01-05 PROCEDURE — 25010000002 AZITHROMYCIN PER 500 MG: Performed by: OBSTETRICS & GYNECOLOGY

## 2023-01-05 PROCEDURE — 25010000002 CEFAZOLIN PER 500 MG: Performed by: OBSTETRICS & GYNECOLOGY

## 2023-01-05 PROCEDURE — 80081 OBSTETRIC PANEL INC HIV TSTG: CPT | Performed by: OBSTETRICS & GYNECOLOGY

## 2023-01-05 PROCEDURE — 94799 UNLISTED PULMONARY SVC/PX: CPT

## 2023-01-05 PROCEDURE — 87591 N.GONORRHOEAE DNA AMP PROB: CPT | Performed by: OBSTETRICS & GYNECOLOGY

## 2023-01-05 PROCEDURE — 25010000002 MORPHINE PER 10 MG: Performed by: NURSE ANESTHETIST, CERTIFIED REGISTERED

## 2023-01-05 PROCEDURE — 25010000002 MIDAZOLAM PER 1 MG: Performed by: NURSE ANESTHETIST, CERTIFIED REGISTERED

## 2023-01-05 PROCEDURE — 87491 CHLMYD TRACH DNA AMP PROBE: CPT | Performed by: OBSTETRICS & GYNECOLOGY

## 2023-01-05 PROCEDURE — 25010000002 KETOROLAC TROMETHAMINE PER 15 MG: Performed by: NURSE ANESTHETIST, CERTIFIED REGISTERED

## 2023-01-05 PROCEDURE — 88307 TISSUE EXAM BY PATHOLOGIST: CPT

## 2023-01-05 PROCEDURE — 58611 LIGATE OVIDUCT(S) ADD-ON: CPT | Performed by: SPECIALIST/TECHNOLOGIST, OTHER

## 2023-01-05 PROCEDURE — 94760 N-INVAS EAR/PLS OXIMETRY 1: CPT

## 2023-01-05 PROCEDURE — 25010000002 METHYLERGONOVINE MALEATE PER 0.2 MG: Performed by: OBSTETRICS & GYNECOLOGY

## 2023-01-05 PROCEDURE — 58611 LIGATE OVIDUCT(S) ADD-ON: CPT | Performed by: OBSTETRICS & GYNECOLOGY

## 2023-01-05 DEVICE — SEAL HEMO SURG ARISTA/AH ABS/PWDR 3GM: Type: IMPLANTABLE DEVICE | Site: ABDOMEN | Status: FUNCTIONAL

## 2023-01-05 RX ORDER — FENTANYL CITRATE 50 UG/ML
INJECTION, SOLUTION INTRAMUSCULAR; INTRAVENOUS AS NEEDED
Status: DISCONTINUED | OUTPATIENT
Start: 2023-01-05 | End: 2023-01-05 | Stop reason: SURG

## 2023-01-05 RX ORDER — OXYCODONE HYDROCHLORIDE 5 MG/1
5 TABLET ORAL EVERY 4 HOURS PRN
Status: DISCONTINUED | OUTPATIENT
Start: 2023-01-05 | End: 2023-01-06 | Stop reason: HOSPADM

## 2023-01-05 RX ORDER — OXYTOCIN/0.9 % SODIUM CHLORIDE 30/500 ML
PLASTIC BAG, INJECTION (ML) INTRAVENOUS CONTINUOUS PRN
Status: DISCONTINUED | OUTPATIENT
Start: 2023-01-05 | End: 2023-01-05 | Stop reason: SURG

## 2023-01-05 RX ORDER — METHYLERGONOVINE MALEATE 0.2 MG/ML
200 INJECTION INTRAVENOUS ONCE AS NEEDED
Status: DISCONTINUED | OUTPATIENT
Start: 2023-01-05 | End: 2023-01-06 | Stop reason: HOSPADM

## 2023-01-05 RX ORDER — DOCUSATE SODIUM 100 MG/1
100 CAPSULE, LIQUID FILLED ORAL 2 TIMES DAILY
Status: DISCONTINUED | OUTPATIENT
Start: 2023-01-05 | End: 2023-01-06 | Stop reason: HOSPADM

## 2023-01-05 RX ORDER — ACETAMINOPHEN 500 MG
1000 TABLET ORAL ONCE
Status: COMPLETED | OUTPATIENT
Start: 2023-01-05 | End: 2023-01-05

## 2023-01-05 RX ORDER — POLYETHYLENE GLYCOL 3350 17 G/17G
17 POWDER, FOR SOLUTION ORAL DAILY
Status: DISCONTINUED | OUTPATIENT
Start: 2023-01-05 | End: 2023-01-06 | Stop reason: HOSPADM

## 2023-01-05 RX ORDER — METHYLERGONOVINE MALEATE 0.2 MG/ML
200 INJECTION INTRAVENOUS ONCE
Status: COMPLETED | OUTPATIENT
Start: 2023-01-05 | End: 2023-01-05

## 2023-01-05 RX ORDER — GABAPENTIN 100 MG/1
100 CAPSULE ORAL 3 TIMES DAILY
Status: DISCONTINUED | OUTPATIENT
Start: 2023-01-05 | End: 2023-01-06 | Stop reason: HOSPADM

## 2023-01-05 RX ORDER — IBUPROFEN 600 MG/1
600 TABLET ORAL EVERY 6 HOURS PRN
Qty: 30 TABLET | Refills: 0 | Status: SHIPPED | OUTPATIENT
Start: 2023-01-05

## 2023-01-05 RX ORDER — MORPHINE SULFATE 1 MG/ML
INJECTION, SOLUTION EPIDURAL; INTRATHECAL; INTRAVENOUS AS NEEDED
Status: DISCONTINUED | OUTPATIENT
Start: 2023-01-05 | End: 2023-01-05 | Stop reason: SURG

## 2023-01-05 RX ORDER — OXYCODONE HYDROCHLORIDE 10 MG/1
10 TABLET ORAL EVERY 4 HOURS PRN
Status: DISCONTINUED | OUTPATIENT
Start: 2023-01-05 | End: 2023-01-06 | Stop reason: HOSPADM

## 2023-01-05 RX ORDER — ONDANSETRON 2 MG/ML
INJECTION INTRAMUSCULAR; INTRAVENOUS AS NEEDED
Status: DISCONTINUED | OUTPATIENT
Start: 2023-01-05 | End: 2023-01-05 | Stop reason: SURG

## 2023-01-05 RX ORDER — BUPIVACAINE HYDROCHLORIDE 7.5 MG/ML
INJECTION, SOLUTION EPIDURAL; RETROBULBAR
Status: COMPLETED | OUTPATIENT
Start: 2023-01-05 | End: 2023-01-05

## 2023-01-05 RX ORDER — OXYCODONE HYDROCHLORIDE 5 MG/1
5 TABLET ORAL EVERY 6 HOURS PRN
Qty: 10 TABLET | Refills: 0 | Status: SHIPPED | OUTPATIENT
Start: 2023-01-05 | End: 2023-01-12

## 2023-01-05 RX ORDER — MIDAZOLAM HYDROCHLORIDE 1 MG/ML
INJECTION INTRAMUSCULAR; INTRAVENOUS AS NEEDED
Status: DISCONTINUED | OUTPATIENT
Start: 2023-01-05 | End: 2023-01-05 | Stop reason: SURG

## 2023-01-05 RX ORDER — PRENATAL VIT NO.126/IRON/FOLIC 28MG-0.8MG
TABLET ORAL DAILY
COMMUNITY

## 2023-01-05 RX ORDER — KETOROLAC TROMETHAMINE 30 MG/ML
30 INJECTION, SOLUTION INTRAMUSCULAR; INTRAVENOUS EVERY 6 HOURS
Status: DISCONTINUED | OUTPATIENT
Start: 2023-01-05 | End: 2023-01-06 | Stop reason: HOSPADM

## 2023-01-05 RX ORDER — SIMETHICONE 80 MG
80 TABLET,CHEWABLE ORAL 4 TIMES DAILY
Status: DISCONTINUED | OUTPATIENT
Start: 2023-01-05 | End: 2023-01-06 | Stop reason: HOSPADM

## 2023-01-05 RX ORDER — ACETAMINOPHEN 500 MG
1000 TABLET ORAL EVERY 6 HOURS
Status: DISCONTINUED | OUTPATIENT
Start: 2023-01-05 | End: 2023-01-06 | Stop reason: HOSPADM

## 2023-01-05 RX ORDER — SODIUM CHLORIDE, SODIUM LACTATE, POTASSIUM CHLORIDE, CALCIUM CHLORIDE 600; 310; 30; 20 MG/100ML; MG/100ML; MG/100ML; MG/100ML
INJECTION, SOLUTION INTRAVENOUS
Status: COMPLETED
Start: 2023-01-05 | End: 2023-01-05

## 2023-01-05 RX ORDER — BUPIVACAINE HCL/0.9 % NACL/PF 0.1 %
2 PLASTIC BAG, INJECTION (ML) EPIDURAL ONCE
Status: COMPLETED | OUTPATIENT
Start: 2023-01-05 | End: 2023-01-05

## 2023-01-05 RX ORDER — OXYTOCIN/0.9 % SODIUM CHLORIDE 30/500 ML
999 PLASTIC BAG, INJECTION (ML) INTRAVENOUS ONCE
Status: DISCONTINUED | OUTPATIENT
Start: 2023-01-05 | End: 2023-01-06 | Stop reason: HOSPADM

## 2023-01-05 RX ORDER — ACETAMINOPHEN 500 MG
1000 TABLET ORAL EVERY 6 HOURS PRN
Qty: 30 TABLET | Refills: 0 | Status: SHIPPED | OUTPATIENT
Start: 2023-01-05

## 2023-01-05 RX ORDER — METHYLERGONOVINE MALEATE 0.2 MG/ML
200 INJECTION INTRAVENOUS ONCE AS NEEDED
Status: DISCONTINUED | OUTPATIENT
Start: 2023-01-05 | End: 2023-01-05 | Stop reason: HOSPADM

## 2023-01-05 RX ORDER — BUPIVACAINE HYDROCHLORIDE 2.5 MG/ML
30 INJECTION, SOLUTION EPIDURAL; INFILTRATION; INTRACAUDAL ONCE
Status: DISCONTINUED | OUTPATIENT
Start: 2023-01-05 | End: 2023-01-05

## 2023-01-05 RX ORDER — MORPHINE SULFATE 1 MG/ML
INJECTION, SOLUTION EPIDURAL; INTRATHECAL; INTRAVENOUS AS NEEDED
Status: DISCONTINUED | OUTPATIENT
Start: 2023-01-05 | End: 2023-01-05

## 2023-01-05 RX ORDER — PRENATAL VIT/IRON FUM/FOLIC AC 27MG-0.8MG
1 TABLET ORAL DAILY
Status: DISCONTINUED | OUTPATIENT
Start: 2023-01-05 | End: 2023-01-06 | Stop reason: HOSPADM

## 2023-01-05 RX ORDER — IBUPROFEN 600 MG/1
600 TABLET ORAL EVERY 6 HOURS
Status: DISCONTINUED | OUTPATIENT
Start: 2023-01-06 | End: 2023-01-06 | Stop reason: HOSPADM

## 2023-01-05 RX ORDER — CARBOPROST TROMETHAMINE 250 UG/ML
250 INJECTION, SOLUTION INTRAMUSCULAR ONCE
Status: DISCONTINUED | OUTPATIENT
Start: 2023-01-05 | End: 2023-01-06 | Stop reason: HOSPADM

## 2023-01-05 RX ORDER — SODIUM CHLORIDE, SODIUM LACTATE, POTASSIUM CHLORIDE, CALCIUM CHLORIDE 600; 310; 30; 20 MG/100ML; MG/100ML; MG/100ML; MG/100ML
125 INJECTION, SOLUTION INTRAVENOUS CONTINUOUS
Status: DISCONTINUED | OUTPATIENT
Start: 2023-01-05 | End: 2023-01-05

## 2023-01-05 RX ORDER — MISOPROSTOL 200 UG/1
600 TABLET ORAL ONCE
Status: DISCONTINUED | OUTPATIENT
Start: 2023-01-05 | End: 2023-01-06 | Stop reason: HOSPADM

## 2023-01-05 RX ORDER — MISOPROSTOL 200 UG/1
800 TABLET ORAL ONCE AS NEEDED
Status: COMPLETED | OUTPATIENT
Start: 2023-01-05 | End: 2023-01-05

## 2023-01-05 RX ORDER — ONDANSETRON 4 MG/1
4 TABLET, FILM COATED ORAL EVERY 8 HOURS PRN
Status: DISCONTINUED | OUTPATIENT
Start: 2023-01-05 | End: 2023-01-06 | Stop reason: HOSPADM

## 2023-01-05 RX ORDER — KETOROLAC TROMETHAMINE 30 MG/ML
INJECTION, SOLUTION INTRAMUSCULAR; INTRAVENOUS AS NEEDED
Status: DISCONTINUED | OUTPATIENT
Start: 2023-01-05 | End: 2023-01-05 | Stop reason: SURG

## 2023-01-05 RX ORDER — TRISODIUM CITRATE DIHYDRATE AND CITRIC ACID MONOHYDRATE 500; 334 MG/5ML; MG/5ML
30 SOLUTION ORAL ONCE
Status: COMPLETED | OUTPATIENT
Start: 2023-01-05 | End: 2023-01-05

## 2023-01-05 RX ORDER — KETOROLAC TROMETHAMINE 30 MG/ML
30 INJECTION, SOLUTION INTRAMUSCULAR; INTRAVENOUS ONCE
Status: DISCONTINUED | OUTPATIENT
Start: 2023-01-05 | End: 2023-01-05 | Stop reason: HOSPADM

## 2023-01-05 RX ORDER — CARBOPROST TROMETHAMINE 250 UG/ML
250 INJECTION, SOLUTION INTRAMUSCULAR
Status: DISCONTINUED | OUTPATIENT
Start: 2023-01-05 | End: 2023-01-05 | Stop reason: HOSPADM

## 2023-01-05 RX ORDER — ONDANSETRON 2 MG/ML
4 INJECTION INTRAMUSCULAR; INTRAVENOUS EVERY 6 HOURS PRN
Status: DISCONTINUED | OUTPATIENT
Start: 2023-01-05 | End: 2023-01-06 | Stop reason: HOSPADM

## 2023-01-05 RX ORDER — MISOPROSTOL 200 UG/1
1000 TABLET ORAL ONCE
Status: DISCONTINUED | OUTPATIENT
Start: 2023-01-05 | End: 2023-01-06 | Stop reason: HOSPADM

## 2023-01-05 RX ORDER — ALUMINA, MAGNESIA, AND SIMETHICONE 2400; 2400; 240 MG/30ML; MG/30ML; MG/30ML
15 SUSPENSION ORAL EVERY 4 HOURS PRN
Status: DISCONTINUED | OUTPATIENT
Start: 2023-01-05 | End: 2023-01-06 | Stop reason: HOSPADM

## 2023-01-05 RX ORDER — BUPIVACAINE HYDROCHLORIDE 2.5 MG/ML
INJECTION, SOLUTION EPIDURAL; INFILTRATION; INTRACAUDAL AS NEEDED
Status: DISCONTINUED | OUTPATIENT
Start: 2023-01-05 | End: 2023-01-06 | Stop reason: HOSPADM

## 2023-01-05 RX ORDER — CALCIUM CARBONATE 200(500)MG
1 TABLET,CHEWABLE ORAL EVERY 4 HOURS PRN
Status: DISCONTINUED | OUTPATIENT
Start: 2023-01-05 | End: 2023-01-06 | Stop reason: HOSPADM

## 2023-01-05 RX ORDER — HYDROCORTISONE 25 MG/G
CREAM TOPICAL 3 TIMES DAILY PRN
Status: DISCONTINUED | OUTPATIENT
Start: 2023-01-05 | End: 2023-01-06 | Stop reason: HOSPADM

## 2023-01-05 RX ORDER — KETAMINE HYDROCHLORIDE 100 MG/ML
INJECTION INTRAMUSCULAR; INTRAVENOUS AS NEEDED
Status: DISCONTINUED | OUTPATIENT
Start: 2023-01-05 | End: 2023-01-05 | Stop reason: SURG

## 2023-01-05 RX ORDER — ACETAMINOPHEN 500 MG
1000 TABLET ORAL EVERY 6 HOURS
Status: DISCONTINUED | OUTPATIENT
Start: 2023-01-06 | End: 2023-01-06 | Stop reason: HOSPADM

## 2023-01-05 RX ORDER — OXYTOCIN/0.9 % SODIUM CHLORIDE 30/500 ML
PLASTIC BAG, INJECTION (ML) INTRAVENOUS
Status: DISPENSED
Start: 2023-01-05 | End: 2023-01-05

## 2023-01-05 RX ORDER — OXYTOCIN/0.9 % SODIUM CHLORIDE 30/500 ML
250 PLASTIC BAG, INJECTION (ML) INTRAVENOUS CONTINUOUS
Status: ACTIVE | OUTPATIENT
Start: 2023-01-05 | End: 2023-01-05

## 2023-01-05 RX ADMIN — FENTANYL CITRATE 35 MCG: 50 INJECTION, SOLUTION INTRAMUSCULAR; INTRAVENOUS at 05:38

## 2023-01-05 RX ADMIN — KETOROLAC TROMETHAMINE 30 MG: 30 INJECTION, SOLUTION INTRAMUSCULAR; INTRAVENOUS at 15:07

## 2023-01-05 RX ADMIN — DOCUSATE SODIUM 100 MG: 100 CAPSULE, LIQUID FILLED ORAL at 21:27

## 2023-01-05 RX ADMIN — FENTANYL CITRATE 15 MCG: 50 INJECTION, SOLUTION INTRAMUSCULAR; INTRAVENOUS at 05:15

## 2023-01-05 RX ADMIN — MISOPROSTOL 800 MCG: 200 TABLET ORAL at 06:27

## 2023-01-05 RX ADMIN — SODIUM CITRATE AND CITRIC ACID MONOHYDRATE 30 ML: 500; 334 SOLUTION ORAL at 05:05

## 2023-01-05 RX ADMIN — BUPIVACAINE HYDROCHLORIDE 1.7 ML: 7.5 INJECTION, SOLUTION EPIDURAL; RETROBULBAR at 05:15

## 2023-01-05 RX ADMIN — GABAPENTIN 100 MG: 100 CAPSULE ORAL at 21:27

## 2023-01-05 RX ADMIN — Medication 150 MCG: at 05:15

## 2023-01-05 RX ADMIN — KETAMINE HYDROCHLORIDE 20 MG: 100 INJECTION INTRAMUSCULAR; INTRAVENOUS at 05:39

## 2023-01-05 RX ADMIN — Medication 2 G: at 05:17

## 2023-01-05 RX ADMIN — KETAMINE HYDROCHLORIDE 50 MG: 100 INJECTION INTRAMUSCULAR; INTRAVENOUS at 06:02

## 2023-01-05 RX ADMIN — ACETAMINOPHEN 1000 MG: 500 TABLET, FILM COATED ORAL at 19:30

## 2023-01-05 RX ADMIN — KETOROLAC TROMETHAMINE 30 MG: 30 INJECTION, SOLUTION INTRAMUSCULAR at 06:18

## 2023-01-05 RX ADMIN — MIDAZOLAM HYDROCHLORIDE 2 MG: 1 INJECTION, SOLUTION INTRAMUSCULAR; INTRAVENOUS at 05:59

## 2023-01-05 RX ADMIN — SIMETHICONE 80 MG: 80 TABLET, CHEWABLE ORAL at 11:00

## 2023-01-05 RX ADMIN — DOCUSATE SODIUM 100 MG: 100 CAPSULE, LIQUID FILLED ORAL at 10:54

## 2023-01-05 RX ADMIN — METHYLERGONOVINE MALEATE 200 MCG: 0.2 INJECTION, SOLUTION INTRAMUSCULAR; INTRAVENOUS at 06:25

## 2023-01-05 RX ADMIN — KETAMINE HYDROCHLORIDE 20 MG: 100 INJECTION INTRAMUSCULAR; INTRAVENOUS at 05:37

## 2023-01-05 RX ADMIN — KETAMINE HYDROCHLORIDE 50 MG: 100 INJECTION INTRAMUSCULAR; INTRAVENOUS at 05:34

## 2023-01-05 RX ADMIN — ACETAMINOPHEN 1000 MG: 500 TABLET, FILM COATED ORAL at 05:05

## 2023-01-05 RX ADMIN — KETAMINE HYDROCHLORIDE 10 MG: 100 INJECTION INTRAMUSCULAR; INTRAVENOUS at 05:50

## 2023-01-05 RX ADMIN — SIMETHICONE 80 MG: 80 TABLET, CHEWABLE ORAL at 18:04

## 2023-01-05 RX ADMIN — OXYCODONE HYDROCHLORIDE 5 MG: 5 TABLET ORAL at 22:01

## 2023-01-05 RX ADMIN — SODIUM CHLORIDE, POTASSIUM CHLORIDE, SODIUM LACTATE AND CALCIUM CHLORIDE: 600; 310; 30; 20 INJECTION, SOLUTION INTRAVENOUS at 05:00

## 2023-01-05 RX ADMIN — KETAMINE HYDROCHLORIDE 20 MG: 100 INJECTION INTRAMUSCULAR; INTRAVENOUS at 06:04

## 2023-01-05 RX ADMIN — MIDAZOLAM HYDROCHLORIDE 2 MG: 1 INJECTION, SOLUTION INTRAMUSCULAR; INTRAVENOUS at 05:43

## 2023-01-05 RX ADMIN — SIMETHICONE 80 MG: 80 TABLET, CHEWABLE ORAL at 21:27

## 2023-01-05 RX ADMIN — ONDANSETRON 8 MG: 2 INJECTION INTRAMUSCULAR; INTRAVENOUS at 05:11

## 2023-01-05 RX ADMIN — GABAPENTIN 100 MG: 100 CAPSULE ORAL at 10:54

## 2023-01-05 RX ADMIN — MIDAZOLAM HYDROCHLORIDE 2 MG: 1 INJECTION, SOLUTION INTRAMUSCULAR; INTRAVENOUS at 05:37

## 2023-01-05 RX ADMIN — OXYTOCIN-SODIUM CHLORIDE 0.9% IV SOLN 30 UNIT/500ML 650 ML/HR: 30-0.9/5 SOLUTION at 05:36

## 2023-01-05 RX ADMIN — SODIUM CHLORIDE, POTASSIUM CHLORIDE, SODIUM LACTATE AND CALCIUM CHLORIDE 1000 ML: 600; 310; 30; 20 INJECTION, SOLUTION INTRAVENOUS at 04:58

## 2023-01-05 RX ADMIN — Medication 1 TABLET: at 10:54

## 2023-01-05 RX ADMIN — ACETAMINOPHEN 1000 MG: 500 TABLET, FILM COATED ORAL at 10:57
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
## 2023-01-05 NOTE — ANESTHESIA POSTPROCEDURE EVALUATION
Patient: Zita Haas    Procedure Summary     Date: 23 Room / Location: [facility] LABOR DELIVERY  [facility] LABOR DELIVERY    Anesthesia Start: 510 Anesthesia Stop: 635    Procedure:  SECTION REPEAT (Abdomen) Diagnosis:       Previous  delivery affecting pregnancy      (Previous  delivery affecting pregnancy [O34.219])    Surgeons: Na Boss MD Provider: Parker Oh CRNA    Anesthesia Type: spinal ASA Status: 3 - Emergent          Anesthesia Type: spinal    Vitals  No vitals data found for the desired time range.          Post Anesthesia Care and Evaluation    Patient location during evaluation: PHASE II  Patient participation: complete - patient participated  Level of consciousness: sleepy but conscious  Pain score: 0  Pain management: adequate    Airway patency: patent  Anesthetic complications: No anesthetic complications  PONV Status: none  Cardiovascular status: acceptable  Respiratory status: acceptable  Hydration status: acceptable    Comments: --------------------            23               0400     --------------------   BP:       145/78     Pulse:      90      --------------------  Spo2: 100%

## 2023-01-05 NOTE — ANESTHESIA PROCEDURE NOTES
Spinal Block      Patient reassessed immediately prior to procedure    Patient location during procedure: OB  Indication:at surgeon's request  Performed By  CRNA/CAA: Parker Oh CRNA  Preanesthetic Checklist  Completed: patient identified, IV checked, site marked, risks and benefits discussed, surgical consent, monitors and equipment checked, pre-op evaluation and timeout performed  Spinal Block Prep:  Patient Position:sitting  Sterile Tech:cap, gloves, gown, mask and sterile barriers  Prep:DuraPrep  Patient Monitoring:blood pressure monitoring, continuous pulse oximetry and EKG    Spinal Block Procedure  Approach:midline  Guidance:landmark technique and palpation technique  Location:L3-L4  Needle Type:Pencan  Needle Gauge:24 G  Placement of Spinal needle event:cerebrospinal fluid aspirated  Paresthesia: no  Fluid Appearance:clear  Medications: bupivacaine PF (MARCAINE) 0.75 % injection - Epidural   1.7 mL - 1/5/2023 5:15:00 AM   Post Assessment  Patient Tolerance:patient tolerated the procedure well with no apparent complications  Complications no

## 2023-01-05 NOTE — H&P
[facility]  HISTORY & PHYSICAL - Obstetrics    Name: Zita Haas  MRN: [id number]  Location: L773/1  Date: 2023   CSN: [id number]      CHIEF COMPLAINT: Contractions    HISTORY OF PRESENT ILLNESS  Zita Haas is a 27 y.o.  at 39w1d by US at [facility] who presents with contractions that started last night.  Denies LOF or vaginal bleeding.  Reports good FM.    She has had no PNC.  She has had 6 visits with [facility] for US.  She last used meth 2 days ago.    Patient denies any chest pain, palpitations, headaches, lightheadedness, shortness of breath, cough, nausea, vomiting, diarrhea, constipation, fever, or chills.    ROS  Review of Systems   Constitutional: Negative.    HENT: Negative.    Eyes: Negative.    Respiratory: Negative.    Cardiovascular: Negative.    Gastrointestinal: Negative.    Endocrine: Negative.    Genitourinary: Negative.    Musculoskeletal: Negative.    Skin: Negative.    Allergic/Immunologic: Negative.    Neurological: Negative.    Hematological: Negative.    Psychiatric/Behavioral: Negative.      PRENATAL LAB RESULTS  None    PRENATAL RISK FACTORS  1.  LTCS x3  2.  No prenatal care  3.  Drug abuse    OB HISTORY  OB History    Para Term  AB Living   5 3 2 1 1 3   SAB IAB Ectopic Molar Multiple Live Births           0 3      # Outcome Date GA Lbr Israel/2nd Weight Sex Delivery Anes PTL Lv   5 Current            4 Term 21 39w0d  3230 g (7 lb 1.9 oz) F CS-LTranv Spinal N DARLING   3 Term 18 39w0d  3204 g (7 lb 1 oz) F CS-LTranv Spinal N DARLING   2  / 36w0d  1701 g (3 lb 12 oz) M CS-LTranv Spinal N DARLING   1 AB              GYN HISTORY  Denies h/o sexually transmitted infections/pelvic inflammatory disease  Denies h/o gynecologic surgeries, including biopsies of the cervix    PAST MEDICAL HISTORY  Past Medical History:   Diagnosis Date   • Anxiety    • Bipolar disorder (HCC)    • Depression    • Migraine    • Post  traumatic stress disorder (PTSD)    • Seizures (HCC)     remote h/o drug induced seizure   • Tobacco dependence syndrome      PAST SURGICAL HISTORY  Past Surgical History:   Procedure Laterality Date   •  SECTION     •  SECTION Bilateral 2018    Procedure:  SECTION REPEAT;  Surgeon: FridayFarzana MD;  Location: [facility] LABOR DELIVERY;  Service: Obstetrics/Gynecology   •  SECTION N/A 3/16/2021    Procedure:  SECTION REPEAT;  Surgeon: Na Boss MD;  Location: [facility] LABOR DELIVERY;  Service: Obstetrics;  Laterality: N/A;   • WISDOM TOOTH EXTRACTION       FAMILY HISTORY  Family History   Problem Relation Age of Onset   • Endometriosis Mother    • Miscarriages / Stillbirths Mother         2   • Other Mother           mother had guillian barre   • Depression Sister    • Anxiety disorder Sister    • Ulcers Maternal Grandmother    • Heart defect Maternal Grandfather      SOCIAL HISTORY  Social History     Socioeconomic History   • Marital status: Significant Other   Tobacco Use   • Smoking status: Every Day     Packs/day: 0.50     Types: Cigarettes   • Smokeless tobacco: Never   Substance and Sexual Activity   • Alcohol use: No   • Drug use: No   • Sexual activity: Yes     Partners: Male     Birth control/protection: None     ALLERGIES  Allergies   Allergen Reactions   • Nicotine Polacrilex Rash     patch     HOME MEDICATIONS  Prior to Admission medications    Medication Sig Start Date End Date Taking? Authorizing Provider   prenatal vitamin (prenatal, CLASSIC, vitamin) tablet Take  by mouth Daily.   Yes Provider, MD Anu   azithromycin (Zithromax Z-Michele) 250 MG tablet Take 2 tablets by mouth on day 1, then 1 tablet daily on days 2-5 22  Kristie Aparicio APRN   guaifenesin (ROBITUSSIN) 100 MG/5ML liquid Take 10 mL by mouth 3 (Three) Times a Day As Needed for Cough. 22  Kristie Aparicio APRN    promethazine-dextromethorphan (PROMETHAZINE-DM) 6.25-15 MG/5ML syrup Take 5 mL by mouth Every 6 (Six) Hours As Needed for Cough. 21  Dariusz Lane MD     PHYSICAL EXAM  /78   Pulse 90   Wt 83.9 kg (185 lb)   BMI 25.80 kg/m²   General: No acute distress.  Well developed, well nourished.  Pleasant.  Heart: Regular rate and rhythm.  No murmurs, rubs, or gallops.  Lungs: Clear to auscultation bilaterally.  No wheezes, rales, or rhonchi.  Abdomen: Soft, nontender to palpation, enlarged by gravid uterus.  Extremities: Mild edema noted bilaterally.    NST Review  Indication: Pre-op   FHT: Baseline 145 bpm, moderate variability, pos accelerations, neg decelerations.  [place]:  Regular contractions every 2-4 minutes.  Impression: Cat 1 FHT    SVE per RN: difficult to assess cervical dilation because cervix posterior and 0 station, patient very intolerant to exam    IMPRESSION  Zita Swanson Samina is a 27 y.o.  at 39w1d secondary to term labor; will proceed with RLTCS.    PLAN  1.  LTCS  - Admit: Labor and Delivery  - Attending: Dr. Na Boss  - Condition: Stable  - Vitals: per protocol  - Activity: ad tuan  - Nursing: NST prior to surgery  - Diet: NPO  - IV fluids:  mL/hr  - Allergies: Nicotine  - Labs: CBC, T&S, UDS  - GBS: unk.  Antibiotics not indicated.  - Ancef 2g, azithromycin 500mg prior to skin incision  - Patient consented for  section.  Reviewed risks and benefits to include injury to surrounding organs (bowel, bladder, ureters, blood vessels, nerves, baby), infection, bleeding (possibly requiring blood transfusion and/or hysterectomy), and maternal/fetal death.  Discussed higher risk of injury due to multiple c-sections and scar tissue.  - Zita Haas and I have discussed pain goals for this hospitalization after reviewing her current clinical condition, medical history and prior pain experiences.  The goal is to keep her pain level appropriate.  To help  achieve this, schedule Tylenol and NSAIDS, +/- Duramorph or PCA.    2.  Completed family status  - Patient strongly desires tubal ligation.  Discussed that since she has not signed MA consents prior to today, we cannot perform sterilization unless other indications arise.    This document has been electronically signed by Na Boss MD on January 5, 2023 04:46 CST.

## 2023-01-05 NOTE — OP NOTE
[facility]  Operative Report    Name: Zita Haas  MRN: [id number]  Date: 2023  CSN: [id number]      Location: [facility] LABOR DELIVERY    Service: Obstetrics    Pre-op Diagnosis:   1.  IUP at 39w1d   2.  Prior  section x3  3.  Term labor  4.  Drug abuse (methamphetamine)  5.  History of  delivery  6.  No prenatal care  7.  Completed family status    Post-op Diagnosis: Same, in addition to delivered    Surgeon: Na Boss MD FACOG    Assistant: Felicitas Graham CST CSFA    Staff:  * No surgical staff found *    Anesthesia: Spinal w/ Duramorph    Anesthesia Staff:  CRNA: Parker Oh CRNA    Operation: Repeat  section and bilateral salpingectomy    Drains: Tinoco catheter, draining clear yellow urine    Complications: None    Findings: Normal appearing abdomen.  Fascia moderately scarred.  Upon entry into the abdomen, clear peritoneal fluid noted.  Bladder scarred to mid-uterus but easily dissected.  Delivery of viable male  weighing 3970 grams with Apgars of 9 and 9.  Bilateral fallopian tubes and ovaries normal; however, fimbria were bleeding necessitating salpingectomy.    Condition: Stable    Specimens/Disposition: Placenta and umbilical cord, bilateral fallopian tubes- to pathology    Estimated Blood Loss: 900 mL  Quantitative Blood Loss: pending  IV Fluids: 1500 mL crystalloid  Urine Output: 100 mL    Indications: Zita Haas, 27 y.o.,  at 39w1d presenting with no prenatal care in term labor.  Patient not candidate for TOLAC due to number of c-sections.  She desired permanent sterilization but had not sign tubal consents prior to today.    Description of Operation:  The patient was identified and the procedure verified as a  section delivery.  The patient was given spinal anesthesia.  Patient prepared and draped in normal sterile fashion in dorsal supine position with a leftward tilt.  A transverse skin incision was  made with the scalpel and carried down through the subcutaneous tissue to the fascia using the Bovie.  Fascial incision was made with the Bovie and extended transversely with Mcintosh scissors.  The superior aspect of the fascia was grasped with Kocher clamps and the rectus muscle was dissected off bluntly and sharply with Mcintosh scissors.  The inferior aspect of the fascia was then grasped with Kocher clamps and the rectus muscle and pyramidalis were dissected off bluntly and then sharply with Mcintosh scissors.  The rectus muscle was then  in the midline and the peritoneum was identified and entered bluntly.  The peritoneal incision was extended transversely.  Bladder blade and retractor were inserted.  The uterovesical peritoneal reflection was identified and incised transversely with Metzenbaum scissors.  The bladder flap was bluntly freed from the lower uterine segment. The bladder blade was adjusted.  A low transverse uterine incision was made with a scalpel and the uterine incision was extended with upward traction.  The amniotic sac was ruptured with an Allis clamp for meconium-stained fluid.  Delivered from cephalic presentation was a live male  weighing 3970 grams with Apgar scores of 9 at one minute and 9 at five minutes.  Cord clamped and cut and infant with bulb suction were handed to awaiting staff.  Cord blood was obtained and sent.  The placenta was removed intact and appeared normal.  Uterus exteriorized and cleared of all clots and debris.  The uterus, tubes and ovaries appeared normal.  The uterine incision was closed with running locked sutures of 0-Vicryl and imbricated with 0-Vicryl.  There was some bleeding noted around the left hysterotomy with hematoma noted; 0-Vicryl interrupted suture was placed to compress hematoma which was successful.  She had some bleeding at the fundus of the uterus; this was achieved with cautery and multiple interrupted sutures of 2-0 Vicryl and 0-Vicryl.   Upon inspection of the adnexa, there was bleeding noted from both fimbria.  Discussed with patient's boyfriend who was agreeable to salpingectomy.  Attention was then turned to the right fallopian tube which was followed out to the fimbria.  The Enseal was used to excise the fallopian tube.  The same procedure was done to the left fallopian tube, which was also completely excised.  Good hemostasis noted from bilateral mesosalpinx.  Posterior cul-de-sac was cleared.  Uterus was reinserted atraumatically.  Hemostasis was observed.  Bilateral paracolic gutters cleared.  Prior to closure of the abdomen, bilateral transversus abdominis planus block was performed with 15 mL of 0.25% bupivacaine on each side under direct visualization.  Inspection of the abdomen and pelvis prior to abdominal wall closure revealed no evidence of retained instruments or sponges.  The fascia was then reapproximated with running sutures of 0-Vicryl.  Subcutaneous layer closed with 2-0 plain gut.  The skin was reapproximated with 3-0 Monocryl in subcuticular fashion.  Exofin dressing applied.  She did have some clots at the end of the procedure; she was given 1000 mcg rectal Cytotec as well as 0.2 mg IM Methergine.  There were no intraoperative complications and patient tolerated the procedure well.  The patient was escorted to her room in stable condition.    The patient received Ancef 2g and azithromycin 500mg for antibiotic prophylaxis prior to the start of the procedure.    Felicitas Graham CST [facility] was responsible for performing the following activities: Retraction, Suction, Irrigation, Suturing, Closing, Placing Dressing and Delivery of Fetus and their skilled assistance was necessary for the success of this case.    This document has been electronically signed by Na Boss MD on January 5, 2023 06:21 CST.

## 2023-01-05 NOTE — L&D DELIVERY NOTE
[facility]   Delivery Note    Patient Name: Zita Haas  : 1995  MRN: [id number]  Date of Delivery: 2023     Diagnosis     Pre & Post-Delivery:  Intrauterine pregnancy at 39w1d  Labor status: Spontaneous Onset of Labor     Normal labor    Previous  delivery affecting pregnancy    History of  delivery, currently pregnant in third trimester    Single delivery by  section    History of IUGR (intrauterine growth retardation) and stillbirth, currently pregnant    Status post bilateral salpingectomy    Single delivery by  section             Problem List    Transfer to Postpartum     Review the Delivery Report for details.     Delivery     Delivery: , Low Transverse     YOB: 2024    Time of Birth:  Gestational Age 5:35 AM   39w1d     Anesthesia: Spinal     Delivering clinician: Na Boss    Forceps?   No   Vacuum? No    Shoulder dystocia present: No        Delivery narrative:  See operative report      Infant     Findings: male  infant     Infant observations: Weight: 3970 g (8 lb 12 oz)   Length: 20.669  in  Observations/Comments:        Apgars: 9  @ 1 minute /    9  @ 5 minutes   Infant Name: Baby Boy     Placenta & Cord         Placenta delivered  Spontaneous  at        Cord: 3 vessels  present.   Nuchal Cord?  no   Cord blood obtained: Yes    Cord gases obtained:  No    Cord gas results: Venous:  No results found for: PHCVEN    Arterial:  No results found for: PHCART     Repair     Episiotomy: None    No    Lacerations: No   Estimated Blood Loss: 900 mL     Quantitative Blood Loss:          Complications     Term labor  No prenatal care  LTCS x3  Drug abuse (methamphetamine)    Disposition     Mother to Mother Baby/Postpartum in stable condition currently.  Baby to NICU in stable condition currently.    Na Boss MD  23  06:38 CST

## 2023-01-06 LAB
C TRACH RRNA CVX QL NAA+PROBE: NEGATIVE
N GONORRHOEA RRNA SPEC QL NAA+PROBE: NEGATIVE
RUBV IGG SERPL IA-ACNC: <0.9 INDEX
SPECIMEN STATUS: NORMAL
TRICHOMONAS VAGINALIS PCR: NEGATIVE

## 2023-01-06 NOTE — PAYOR COMM NOTE
[facility]  Case Managment Extender   Lily Luo  (P) [phone]  (F) [phone]              REF# [id number]  Karson Haas (27 y.o. Female)     Date of Birth   1995    Social Security Number       Address   [address] [place] [address]    Home Phone   [phone]    MRN   [id number]       Jewish   Sikhism    Marital Status   Significant Other                            Admission Date   23    Admission Type   Elective    Admitting Provider   Na Boss MD    Attending Provider       Department, Room/Bed   [facility] MOTHER BABY, M751/1       Discharge Date   2023    Discharge Disposition   Home or Self Care    Discharge Destination                               Attending Provider: (none)   Allergies: Nicotine Polacrilex    Isolation: None   Infection: None   Code Status: Prior    Ht: --   Wt: 83.9 kg (185 lb)    Admission Cmt: None   Principal Problem: Normal labor [O80,Z37.9]                 Active Insurance as of 2023     Primary Coverage     Payor Plan Insurance Group Employer/Plan Group    [place] [id number]     Payor Plan Address Payor Plan Phone Number Payor Plan Fax Number Effective Dates    [employer] [address] [address] [phone]  2021 - None Entered    [facility] [address]       Subscriber Name Subscriber Birth Date Member ID       KARSON HAAS 1995 [id number]                 Emergency Contacts      (Rel.) Home Phone Work Phone Mobile Phone    DAVID PRITCHETT (Significant Other) [phone] -- [phone]               Discharge Summary      Na Boss MD at 23          [facility]  Discharge Summary  Patient Name: Karson Haas  : 1995  MRN: [id number]  CSN: [id number]    Discharge Summary    Date of Admission: 2023   Date of Discharge: 2023    Principle  Discharge Dx: Active Hospital Problems    Diagnosis  POA   • **Normal labor [O80, Z37.9]  Not Applicable   • Status post bilateral salpingectomy [Z90.79]  Not Applicable   • Single delivery by  section [O82]  No   • History of IUGR (intrauterine growth retardation) and stillbirth, currently pregnant [O09.299]  Not Applicable     2022 10/01 REGULATORY IMPORT REPLACEMENT     • Single delivery by  section [O82]  Yes   • History of  delivery, currently pregnant in third trimester [O09.893]  Not Applicable   • Previous  delivery affecting pregnancy [O34.219]  Yes      Procedures Performed: Procedure(s):   SECTION REPEAT WITH SALPINGECTOMY   Brief History: Patient is a 27 y.o. now  who presented to labor and delivery at 39w1d in term labor with no prenatal care; she desired RLTCS.  She desired permanent sterilization but had no prenatal care to sign sterilization consents.   Hospital Course: Patient presented at 39w1d in term labor with no prenatal care; she desired RLTCS.  She desired permanent sterilization but had no prenatal care to sign sterilization consents.  She had a RLTCS and B/L salpingectomy due to bleeding from the fallopian tube/fimbria.  Patient eloped after 12h of hospitalization but returned.  She then requested to be discharged, even if AMA.  She tolerated her meals, voiding spontaneously, had minimal bleeding.  Given that she was meeting all post-op milestones, AMA forms were not necessitated as she was stable and her vital signs normal.  Discharge instructions were given.  All questions were answered   Condition:  Discharge Activity:                                                      Discharge Diet: Stable  Activity Instructions     Bathing Restrictions      Type of Restriction: Bathing    Bathing Restrictions: Other    Explain Bathing Restrictions: No soaking in bathtub for 4 weeks/ Showers are fine.    Driving Restrictions      Type of Restriction:  Driving    Driving Restrictions: No Driving (Time Limited)    Length: Other    Indicate Length of Restriction: No driving for 1 week or while on narcotic pain medications. You must be able to quickly press on the brake before driving. Riding is car is fine.    Lifting Restrictions      Type of Restriction: Lifting    Lifting Restrictions: Other    Explain Lifing Restrictions: No lifting more than infant and baby carrier together for 6 weeks.    Pelvic Rest      Nothing in the vagina for 6 weeks to include tampons, douching, or sexual intercourse.    Sexual Activity Restrictions      Type of Restriction: Sex    Explain Sexual Activity Restrictions: No sexual intercourse, tampon use, or douching for at least 6 weeks        Regular   Discharge Medications:    Your medication list      START taking these medications      Instructions Last Dose Given Next Dose Due   acetaminophen 500 MG tablet  Commonly known as: TYLENOL      Take 2 tablets by mouth Every 6 (Six) Hours As Needed for Mild Pain.       ibuprofen 600 MG tablet  Commonly known as: ADVIL,MOTRIN      Take 1 tablet by mouth Every 6 (Six) Hours As Needed for Mild Pain or Moderate Pain.       oxyCODONE 5 MG immediate release tablet  Commonly known as: ROXICODONE      Take 1 tablet by mouth Every 6 (Six) Hours As Needed for Severe Pain for up to 7 days.          CONTINUE taking these medications      Instructions Last Dose Given Next Dose Due   prenatal (CLASSIC) vitamin  tablet  Generic drug: prenatal vitamin      Take  by mouth Daily.             Where to Get Your Medications      These medications were sent to [facility]/pharmacy #[id number] - [place], [place] - [address] [place] [address] - [phone]  - [phone] [address] [address]    Phone: [phone] ·   acetaminophen 500 MG tablet  · ibuprofen 600 MG tablet  · oxyCODONE 5 MG immediate release tablet        Discharge Disposition: Home   Follow-up: No future appointments.  1 week PP visit  6 week PP  visit     <30 minutes spent with the patient.    This document has been electronically signed by Na Boss MD on January 5, 2023 20:57 CST.    Electronically signed by Na Boss MD at 01/05/23 2100

## 2023-01-06 NOTE — NURSING NOTE
Patient asked nurse about being discharged even if it is AMA. States she knows her own body and believes she is ready to go home. After consulting with  patient was clear to be discharged without use of AMA since she has met all her post partum goals and is stable. Patient walks around with no complaints. Bleeding light and fundus firm. Patient was educated on risks of leaving before 24 hours PP. States she understand the risks and is ok with going home. States she will call unit if she has concerns. Patient educated on PP do's and dont's for 6 week time period. Educated on how to wash incision, not to lift, not to drive while taking pain medication, to be on pelvic rest for 6 weeks, and to call provider if bleeding becomes heavy. Patient states understanding for all education.   PPD score of 1  No concerns for discharge

## 2023-01-06 NOTE — DISCHARGE INSTR - ACTIVITY
"Notify Dr of... heavy bleeding, passing of clots, foul odor to your discharge, temperature above 100.4, burning when urinating, gapping or drainage from laceration, or for pain not relieved by taking pain medication, redness or streaking in breasts, pain or redness in legs.    Take all medications as prescribed.  Take rest periods several times during the day.  \"Baby Blues\" are normal and may be present around the 3rd-4th day after delivery. If they last longer than 2-3 days, please let your Dr know.  Pelvic rest for 6 weeks. No douching, tampons, or intercourse  No driving while taking pain medication  No lifting anything heavier than the baby for 2 weeks  Wear a good supportive bra 24 hours/day to prevent engorgement    "

## 2023-01-06 NOTE — DISCHARGE SUMMARY
[facility]  Discharge Summary  Patient Name: Zita Haas  : 1995  MRN: [id number]  CSN: [id number]    Discharge Summary    Date of Admission: 2023   Date of Discharge: 2023    Principle Discharge Dx: Active Hospital Problems    Diagnosis  POA   • **Normal labor [O80, Z37.9]  Not Applicable   • Status post bilateral salpingectomy [Z90.79]  Not Applicable   • Single delivery by  section [O82]  No   • History of IUGR (intrauterine growth retardation) and stillbirth, currently pregnant [O09.299]  Not Applicable     2022 10/01 REGULATORY IMPORT REPLACEMENT     • Single delivery by  section [O82]  Yes   • History of  delivery, currently pregnant in third trimester [O09.893]  Not Applicable   • Previous  delivery affecting pregnancy [O34.219]  Yes      Procedures Performed: Procedure(s):   SECTION REPEAT WITH SALPINGECTOMY   Brief History: Patient is a 27 y.o. now  who presented to labor and delivery at 39w1d in term labor with no prenatal care; she desired RLTCS.  She desired permanent sterilization but had no prenatal care to sign sterilization consents.   Hospital Course: Patient presented at 39w1d in term labor with no prenatal care; she desired RLTCS.  She desired permanent sterilization but had no prenatal care to sign sterilization consents.  She had a RLTCS and B/L salpingectomy due to bleeding from the fallopian tube/fimbria.  Patient eloped after 12h of hospitalization but returned.  She then requested to be discharged, even if AMA.  She tolerated her meals, voiding spontaneously, had minimal bleeding.  Given that she was meeting all post-op milestones, AMA forms were not necessitated as she was stable and her vital signs normal.  Discharge instructions were given.  All questions were answered   Condition:  Discharge Activity:                                                      Discharge Diet: Stable  Activity  Instructions     Bathing Restrictions      Type of Restriction: Bathing    Bathing Restrictions: Other    Explain Bathing Restrictions: No soaking in bathtub for 4 weeks/ Showers are fine.    Driving Restrictions      Type of Restriction: Driving    Driving Restrictions: No Driving (Time Limited)    Length: Other    Indicate Length of Restriction: No driving for 1 week or while on narcotic pain medications. You must be able to quickly press on the brake before driving. Riding is car is fine.    Lifting Restrictions      Type of Restriction: Lifting    Lifting Restrictions: Other    Explain Lifing Restrictions: No lifting more than infant and baby carrier together for 6 weeks.    Pelvic Rest      Nothing in the vagina for 6 weeks to include tampons, douching, or sexual intercourse.    Sexual Activity Restrictions      Type of Restriction: Sex    Explain Sexual Activity Restrictions: No sexual intercourse, tampon use, or douching for at least 6 weeks        Regular   Discharge Medications:    Your medication list      START taking these medications      Instructions Last Dose Given Next Dose Due   acetaminophen 500 MG tablet  Commonly known as: TYLENOL      Take 2 tablets by mouth Every 6 (Six) Hours As Needed for Mild Pain.       ibuprofen 600 MG tablet  Commonly known as: ADVIL,MOTRIN      Take 1 tablet by mouth Every 6 (Six) Hours As Needed for Mild Pain or Moderate Pain.       oxyCODONE 5 MG immediate release tablet  Commonly known as: ROXICODONE      Take 1 tablet by mouth Every 6 (Six) Hours As Needed for Severe Pain for up to 7 days.          CONTINUE taking these medications      Instructions Last Dose Given Next Dose Due   prenatal (CLASSIC) vitamin  tablet  Generic drug: prenatal vitamin      Take  by mouth Daily.             Where to Get Your Medications      These medications were sent to [facility]/pharmacy #[id number] - [place], [place] - [address] [facility] [phone] [facility] [phone] [address]  [facility] [address]    Phone: [phone] ·   acetaminophen 500 MG tablet  · ibuprofen 600 MG tablet  · oxyCODONE 5 MG immediate release tablet        Discharge Disposition: Home   Follow-up: No future appointments.  1 week PP visit  6 week PP visit     <30 minutes spent with the patient.    This document has been electronically signed by Na Boss MD on January 5, 2023 20:57 CST.

## 2023-01-06 NOTE — PLAN OF CARE
Problem: Adult Inpatient Plan of Care  Goal: Plan of Care Review  Outcome: Met  Goal: Patient-Specific Goal (Individualized)  Outcome: Met  Goal: Absence of Hospital-Acquired Illness or Injury  Outcome: Met  Intervention: Identify and Manage Fall Risk  Recent Flowsheet Documentation  Taken 1/5/2023 1938 by Nae Pendleton RN  Safety Promotion/Fall Prevention:   assistive device/personal items within reach   clutter free environment maintained  Taken 1/5/2023 1937 by Nae Pendleton RN  Safety Promotion/Fall Prevention: safety round/check completed  Intervention: Prevent Skin Injury  Recent Flowsheet Documentation  Taken 1/5/2023 1938 by Nae Pendleton RN  Body Position: position changed independently  Intervention: Prevent and Manage VTE (Venous Thromboembolism) Risk  Recent Flowsheet Documentation  Taken 1/5/2023 1938 by Nae Pendleton RN  Activity Management:   up ad tuan   up in chair  Intervention: Prevent Infection  Recent Flowsheet Documentation  Taken 1/5/2023 1938 by Nae Pendleton RN  Infection Prevention:   visitors restricted/screened   rest/sleep promoted  Goal: Optimal Comfort and Wellbeing  Outcome: Met  Intervention: Monitor Pain and Promote Comfort  Recent Flowsheet Documentation  Taken 1/5/2023 1938 by Nae Pendleton RN  Pain Management Interventions: (was ok with waiting till scheduled meds were due, just took tylenol) no interventions per patient request  Intervention: Provide Person-Centered Care  Recent Flowsheet Documentation  Taken 1/5/2023 1938 by Nae Pendleton RN  Trust Relationship/Rapport:   care explained   questions answered   questions encouraged   thoughts/feelings acknowledged  Goal: Readiness for Transition of Care  Outcome: Met  Intervention: Mutually Develop Transition Plan  Recent Flowsheet Documentation  Taken 1/5/2023 2139 by Nae Pendleton RN  Equipment Needed After Discharge: none  Equipment Currently Used at Home: none  Anticipated Changes Related to Illness:  none  Transportation Anticipated: family or friend will provide  Transportation Concerns: none  Concerns to be Addressed: substance/tobacco abuse/use  Readmission Within the Last 30 Days: no previous admission in last 30 days  Patient/Family Anticipated Services at Transition: none  Patient/Family Anticipates Transition to: home     Problem: Adjustment to Role Transition (Postpartum  Delivery)  Goal: Successful Maternal Role Transition  Outcome: Met     Problem: Bleeding (Postpartum  Delivery)  Goal: Hemostasis  Outcome: Met     Problem: Infection (Postpartum  Delivery)  Goal: Absence of Infection Signs and Symptoms  Outcome: Met     Problem: Pain (Postpartum  Delivery)  Goal: Acceptable Pain Control  Outcome: Met  Intervention: Prevent or Manage Pain  Recent Flowsheet Documentation  Taken 2023 by Nae Pendleton, RN  Pain Management Interventions: (was ok with waiting till scheduled meds were due, just took tylenol) no interventions per patient request     Problem: Postoperative Nausea and Vomiting (Postpartum  Delivery)  Goal: Nausea and Vomiting Relief  Outcome: Met     Problem: Postoperative Urinary Retention (Postpartum  Delivery)  Goal: Effective Urinary Elimination  Outcome: Met     Problem: Device-Related Complication Risk (Anesthesia/Analgesia, Neuraxial)  Goal: Safe Infusion Delivery Completion  Outcome: Met     Problem: Infection (Anesthesia/Analgesia, Neuraxial)  Goal: Absence of Infection Signs and Symptoms  Outcome: Met  Intervention: Prevent or Manage Infection  Recent Flowsheet Documentation  Taken 2023 by Nae Pendleton, RN  Infection Prevention:   visitors restricted/screened   rest/sleep promoted     Problem: Nausea and Vomiting (Anesthesia/Analgesia, Neuraxial)  Goal: Nausea and Vomiting Relief  Outcome: Met     Problem: Pain (Anesthesia/Analgesia, Neuraxial)  Goal: Effective Pain Control  Outcome: Met  Intervention: Prevent or  Manage Pain  Recent Flowsheet Documentation  Taken 2023 by Nae Pendleton RN  Pain Management Interventions: (was ok with waiting till scheduled meds were due, just took tylenol) no interventions per patient request  Diversional Activities:   television   smartphone     Problem: Respiratory Compromise (Anesthesia/Analgesia, Neuraxial)  Goal: Effective Oxygenation and Ventilation  Outcome: Met     Problem: Sensorimotor Impairment (Anesthesia/Analgesia, Neuraxial)  Goal: Baseline Motor Function  Outcome: Met  Intervention: Optimize Sensorimotor Function  Recent Flowsheet Documentation  Taken 2023 by Nae Pendleton RN  Safety Promotion/Fall Prevention:   assistive device/personal items within reach   clutter free environment maintained  Taken 2023 by Nae Pendleton RN  Safety Promotion/Fall Prevention: safety round/check completed     Problem: Urinary Retention (Anesthesia/Analgesia, Neuraxial)  Goal: Effective Urinary Elimination  Outcome: Met     Problem: Fall Injury Risk  Goal: Absence of Fall and Fall-Related Injury  Outcome: Met  Intervention: Identify and Manage Contributors  Recent Flowsheet Documentation  Taken 2023 by Nae Pendleton RN  Medication Review/Management: medications reviewed  Intervention: Promote Injury-Free Environment  Recent Flowsheet Documentation  Taken 2023 by Nae Pendleton RN  Safety Promotion/Fall Prevention:   assistive device/personal items within reach   clutter free environment maintained  Taken 2023 by Nae Pendleton RN  Safety Promotion/Fall Prevention: safety round/check completed     Problem:  Fall Injury Risk  Goal: Absence of Fall, Infant Drop and Related Injury  Outcome: Met  Intervention: Identify and Manage Contributors  Recent Flowsheet Documentation  Taken 2023 by Nae Pendleton RN  Medication Review/Management: medications reviewed  Intervention: Promote Injury-Free Environment  Recent  Flowsheet Documentation  Taken 1/5/2023 1938 by Nae Pendleton, RN  Safety Promotion/Fall Prevention:   assistive device/personal items within reach   clutter free environment maintained  Taken 1/5/2023 1937 by Nae Pendleton, RN  Safety Promotion/Fall Prevention: safety round/check completed   Goal Outcome Evaluation:

## 2023-01-09 LAB — REF LAB TEST METHOD: NORMAL

## 2023-01-14 LAB
AMPHET/CREAT UR: 4232 NG/MG CREAT
AMPHETAMINES UR QL CFM: NORMAL
LEVEL OF DETECTION:: NORMAL
MDA/CREAT UR: NOT DETECTED NG/MG CREAT
MDMA/CREAT UR: NOT DETECTED NG/MG CREAT
METHAMPHET/CREAT UR: >6098 NG/MG CREAT

## 2023-02-07 ENCOUNTER — TELEPHONE (OUTPATIENT)
Dept: OBSTETRICS AND GYNECOLOGY | Facility: CLINIC | Age: 28
End: 2023-02-07

## 2023-02-07 NOTE — TELEPHONE ENCOUNTER
En w/ [employer] called requesting the signed medicaid sterilization consent form to be scanned into the PT's chart. En can be reached at [phone] with any questions.

## (undated) DEVICE — SUT VIC 0 CT 36IN J958H

## (undated) DEVICE — SUT  GUT PLAIN 2/0 CT1 27IN 843H

## (undated) DEVICE — SUT MNCRYL 0/0 CTX 36IN Y398H

## (undated) DEVICE — GARMENT,MEDLINE,DVT,INT,CALF,MED, GEN2: Brand: MEDLINE

## (undated) DEVICE — SUT MNCRYL 3/0 Y936H

## (undated) DEVICE — SYS SKIN CLS DERMABOND PRINEO W/22CM MESH TP

## (undated) DEVICE — TRY SPINE PENCAN 24GA X4IN

## (undated) DEVICE — GLV SURG SIGNATURE ESSENTIAL PF LTX SZ6.5

## (undated) DEVICE — GLV SURG SENSICARE GREEN W/ALOE PF LF 6.5 STRL

## (undated) DEVICE — SUT VIC 3/0 CTI 36IN J944H

## (undated) DEVICE — INTENDED FOR TISSUE SEPARATION, AND OTHER PROCEDURES THAT REQUIRE A SHARP SURGICAL BLADE TO PUNCTURE OR CUT.: Brand: BARD-PARKER ® STAINLESS STEEL BLADES

## (undated) DEVICE — STERILE POLYISOPRENE POWDER-FREE SURGICAL GLOVES WITH EMOLLIENT COATING: Brand: PROTEXIS

## (undated) DEVICE — PK C/SECT 60

## (undated) DEVICE — SUT VIC 0 CTX 36IN J978H

## (undated) DEVICE — SYS CLS SKIN PREMIERPRO EXOFINFUSION 22CM

## (undated) DEVICE — GOWN,PREVENTION PLUS,XLNG/XXLARGE,STRL: Brand: MEDLINE

## (undated) DEVICE — SUT VIC 0 CT1 36IN J946H